# Patient Record
Sex: MALE | Race: WHITE | NOT HISPANIC OR LATINO | URBAN - METROPOLITAN AREA
[De-identification: names, ages, dates, MRNs, and addresses within clinical notes are randomized per-mention and may not be internally consistent; named-entity substitution may affect disease eponyms.]

---
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
## 2022-03-01 PROBLEM — Z00.00 ENCOUNTER FOR PREVENTIVE HEALTH EXAMINATION: Status: ACTIVE | Noted: 2022-03-01

## 2022-05-07 ENCOUNTER — TRANSCRIPTION ENCOUNTER (OUTPATIENT)
Age: 22
End: 2022-05-07

## 2022-05-07 ENCOUNTER — EMERGENCY (EMERGENCY)
Facility: HOSPITAL | Age: 22
LOS: 1 days | Discharge: OTHER INSTITUTION | End: 2022-05-07
Attending: EMERGENCY MEDICINE
Payer: COMMERCIAL

## 2022-05-07 VITALS
HEIGHT: 71 IN | RESPIRATION RATE: 20 BRPM | SYSTOLIC BLOOD PRESSURE: 112 MMHG | DIASTOLIC BLOOD PRESSURE: 72 MMHG | WEIGHT: 145.06 LBS | HEART RATE: 75 BPM | OXYGEN SATURATION: 99 % | TEMPERATURE: 98 F

## 2022-05-07 DIAGNOSIS — F41.9 ANXIETY DISORDER, UNSPECIFIED: ICD-10-CM

## 2022-05-07 LAB
ALBUMIN SERPL ELPH-MCNC: 4.7 G/DL — SIGNIFICANT CHANGE UP (ref 3.3–5.2)
ALP SERPL-CCNC: 73 U/L — SIGNIFICANT CHANGE UP (ref 40–120)
ALT FLD-CCNC: 10 U/L — SIGNIFICANT CHANGE UP
AMPHET UR-MCNC: NEGATIVE — SIGNIFICANT CHANGE UP
ANION GAP SERPL CALC-SCNC: 12 MMOL/L — SIGNIFICANT CHANGE UP (ref 5–17)
APAP SERPL-MCNC: <3 UG/ML — LOW (ref 10–26)
AST SERPL-CCNC: 15 U/L — SIGNIFICANT CHANGE UP
BARBITURATES UR SCN-MCNC: NEGATIVE — SIGNIFICANT CHANGE UP
BASOPHILS # BLD AUTO: 0.05 K/UL — SIGNIFICANT CHANGE UP (ref 0–0.2)
BASOPHILS NFR BLD AUTO: 1.1 % — SIGNIFICANT CHANGE UP (ref 0–2)
BENZODIAZ UR-MCNC: NEGATIVE — SIGNIFICANT CHANGE UP
BILIRUB SERPL-MCNC: 0.3 MG/DL — LOW (ref 0.4–2)
BUN SERPL-MCNC: 9.9 MG/DL — SIGNIFICANT CHANGE UP (ref 8–20)
CALCIUM SERPL-MCNC: 9.5 MG/DL — SIGNIFICANT CHANGE UP (ref 8.6–10.2)
CHLORIDE SERPL-SCNC: 104 MMOL/L — SIGNIFICANT CHANGE UP (ref 98–107)
CO2 SERPL-SCNC: 26 MMOL/L — SIGNIFICANT CHANGE UP (ref 22–29)
COCAINE METAB.OTHER UR-MCNC: NEGATIVE — SIGNIFICANT CHANGE UP
CREAT SERPL-MCNC: 0.9 MG/DL — SIGNIFICANT CHANGE UP (ref 0.5–1.3)
EGFR: 125 ML/MIN/1.73M2 — SIGNIFICANT CHANGE UP
EOSINOPHIL # BLD AUTO: 0.3 K/UL — SIGNIFICANT CHANGE UP (ref 0–0.5)
EOSINOPHIL NFR BLD AUTO: 6.7 % — HIGH (ref 0–6)
ETHANOL SERPL-MCNC: <10 MG/DL — SIGNIFICANT CHANGE UP (ref 0–9)
GLUCOSE SERPL-MCNC: 99 MG/DL — SIGNIFICANT CHANGE UP (ref 70–99)
HCT VFR BLD CALC: 48.1 % — SIGNIFICANT CHANGE UP (ref 39–50)
HGB BLD-MCNC: 15.5 G/DL — SIGNIFICANT CHANGE UP (ref 13–17)
IMM GRANULOCYTES NFR BLD AUTO: 0.2 % — SIGNIFICANT CHANGE UP (ref 0–1.5)
LYMPHOCYTES # BLD AUTO: 1.71 K/UL — SIGNIFICANT CHANGE UP (ref 1–3.3)
LYMPHOCYTES # BLD AUTO: 37.9 % — SIGNIFICANT CHANGE UP (ref 13–44)
MCHC RBC-ENTMCNC: 28.1 PG — SIGNIFICANT CHANGE UP (ref 27–34)
MCHC RBC-ENTMCNC: 32.2 GM/DL — SIGNIFICANT CHANGE UP (ref 32–36)
MCV RBC AUTO: 87.3 FL — SIGNIFICANT CHANGE UP (ref 80–100)
METHADONE UR-MCNC: NEGATIVE — SIGNIFICANT CHANGE UP
MONOCYTES # BLD AUTO: 0.42 K/UL — SIGNIFICANT CHANGE UP (ref 0–0.9)
MONOCYTES NFR BLD AUTO: 9.3 % — SIGNIFICANT CHANGE UP (ref 2–14)
NEUTROPHILS # BLD AUTO: 2.02 K/UL — SIGNIFICANT CHANGE UP (ref 1.8–7.4)
NEUTROPHILS NFR BLD AUTO: 44.8 % — SIGNIFICANT CHANGE UP (ref 43–77)
OPIATES UR-MCNC: NEGATIVE — SIGNIFICANT CHANGE UP
PCP SPEC-MCNC: SIGNIFICANT CHANGE UP
PCP UR-MCNC: NEGATIVE — SIGNIFICANT CHANGE UP
PLATELET # BLD AUTO: 246 K/UL — SIGNIFICANT CHANGE UP (ref 150–400)
POTASSIUM SERPL-MCNC: 4.2 MMOL/L — SIGNIFICANT CHANGE UP (ref 3.5–5.3)
POTASSIUM SERPL-SCNC: 4.2 MMOL/L — SIGNIFICANT CHANGE UP (ref 3.5–5.3)
PROT SERPL-MCNC: 7 G/DL — SIGNIFICANT CHANGE UP (ref 6.6–8.7)
RBC # BLD: 5.51 M/UL — SIGNIFICANT CHANGE UP (ref 4.2–5.8)
RBC # FLD: 12.8 % — SIGNIFICANT CHANGE UP (ref 10.3–14.5)
SALICYLATES SERPL-MCNC: <0.6 MG/DL — LOW (ref 10–20)
SARS-COV-2 RNA SPEC QL NAA+PROBE: SIGNIFICANT CHANGE UP
SODIUM SERPL-SCNC: 142 MMOL/L — SIGNIFICANT CHANGE UP (ref 135–145)
THC UR QL: POSITIVE
TSH SERPL-MCNC: 2.14 UIU/ML — SIGNIFICANT CHANGE UP (ref 0.27–4.2)
WBC # BLD: 4.51 K/UL — SIGNIFICANT CHANGE UP (ref 3.8–10.5)
WBC # FLD AUTO: 4.51 K/UL — SIGNIFICANT CHANGE UP (ref 3.8–10.5)

## 2022-05-07 PROCEDURE — 99284 EMERGENCY DEPT VISIT MOD MDM: CPT

## 2022-05-07 PROCEDURE — 90792 PSYCH DIAG EVAL W/MED SRVCS: CPT

## 2022-05-07 PROCEDURE — 93010 ELECTROCARDIOGRAM REPORT: CPT

## 2022-05-07 RX ORDER — SERTRALINE 25 MG/1
50 TABLET, FILM COATED ORAL DAILY
Refills: 0 | Status: DISCONTINUED | OUTPATIENT
Start: 2022-05-07 | End: 2022-05-11

## 2022-05-07 RX ADMIN — Medication 1 MILLIGRAM(S): at 22:27

## 2022-05-07 RX ADMIN — SERTRALINE 50 MILLIGRAM(S): 25 TABLET, FILM COATED ORAL at 21:01

## 2022-05-07 NOTE — ED BEHAVIORAL HEALTH ASSESSMENT NOTE - HPI (INCLUDE ILLNESS QUALITY, SEVERITY, DURATION, TIMING, CONTEXT, MODIFYING FACTORS, ASSOCIATED SIGNS AND SYMPTOMS)
Patient is 22 year old male, single, domiciled with parents, student at [employer] lives on campus. PPHX of depression, no prior psychiatric hospitalizations, no suicide attempt; no legal hx, no hx of violence; no significant PMHX. Patient presented to [facility] with worsening depression and suicidality. Psychiatry consulted for assessment of mood symptoms. Patient is 22 year old male, single, domiciled with parents, student at [employer] lives on campus. PPHX of depression, no prior psychiatric hospitalizations, no suicide attempt; no legal hx, no hx of violence; no significant PMHX. Patient presented to [facility] with worsening depression and suicidality. Psychiatry consulted for assessment of mood symptoms.    Patient was seen and evaluated in emergency room, mother at bedside. Patient interviewed in private, he presents calm and cooperative, well related, superficially pleasant and engaged in interview. Patient reports that he has had worsening depression over the past several months in the context of increased external stressors. Most recently he has been having "constant suicidal ideations" and last night he had a self-aborted suicide attempt in which he impulsively put a plastic bag over his face and pulled it tightly around his neck. He reports that he "felt relief for the first time." He goes on to note that he believes he was having a panic attack at the time in which he placed the bag over his face and denies that he had plan to do such. He is can not elaborate on why he decided to discontinue suicide attempt, but her reports that he did notify his outpatient therapist who instructed him to notify his parents.     He reports worsening depressive mood features including isolative behaviors while he is at school. He notes poor school performance with low motivation for ADLs and tasks, he complains of poor concentration, anhedonia, and difficulty with sleep latency, with sucidality. He reports worsening mood features in the contect . He reports he has ruminating thoughts and increased worries, finding them difficult to control. He admits to having panic attacks. Patient is 22 year old male, single, domiciled with parents, student at [employer] lives on campus. PPHX of depression and anxiety, no prior psychiatric hospitalizations, no suicide attempt; no legal hx, no hx of violence; no significant PMHX. Patient presented to [facility] with worsening depression and suicidality. Psychiatry consulted for assessment of mood symptoms.    Patient was seen and evaluated in emergency room, mother at bedside. Patient interviewed in private, he presents calm and cooperative, well related, superficially pleasant and engaged in interview. Patient reports that he has had worsening depression over the past several months in the context of increased external stressors. Most recently he has been having "constant suicidal ideations, I am always thinking of ways I could die, like I will see a train and then see myself getting hit by it." He reports last night he had a self-aborted suicide attempt in which he impulsively put a plastic bag over his face and pulled it tightly around his neck, no loss of consciousness. He reports that he "felt relief for the first time." He goes on to note that he believes he was having a panic attack at the time in which he placed the bag over his face and denies that he had plan to do such. He is can not elaborate on why he decided to discontinue suicide attempt, but her reports that he did notify his outpatient therapist who instructed him to notify his parents.     He reports worsening depressive mood features including isolative behaviors while he is at school, with limited psychosocial support. He notes poor school performance "horribly" with low motivation for ADLs and tasks, he complains of poor concentration, anhedonia, and difficulty with sleep latency, with suicidality. He states he participated in outpatient therapy but his psychiatrist who had been treating him with Zoloft 50mg x 1 year stopped excepting his insurance and gave him a 3 month supply until he found another provider. He reports he ran out of medication last week and was only seen for a brief intake with a psychiatrist, no medications were prescribed and with pending intake paperwork. He reports worsening mood features in the context of breakup with GF after 2.5 year relationship, he sites her as being his main source of support. He reports he has ruminating thoughts and increased worries, finding them difficult to control. He admits to having intermittent panic attacks. Patient admits to increasing cannabis use, reporting that it helps him feel more relaxed. He denies all other illicit drug use. Denies ETOH use. Appetite is unremarkable. Patients denies subjective manic features. NO AVH, no delusions or paranoia elicited. He can not identify protective factors and reports that he doesn't really see himself in the future and has no future plans. Patient is requesting inpatient psychiatric hospitalization for stabilization of mood symptoms.    Spoke with mother Cindy who reports that she had to send his father to pick him up from school after getting a call from him regarding worsening depression. She collaborates with the above statement regarding his psychiatric treatment history. She reports there were no development abnormalities, patient met developmental milestones appropriately. He did well in school and graduated at the top of his class, but regards he has been having a difficult time at college with limited support from peers. She is in agreement that patient would benefit from inpatient treatment for depression.

## 2022-05-07 NOTE — ED ADULT TRIAGE NOTE - CHIEF COMPLAINT QUOTE
for the past week Juanita been without my depression medication, recently came home from school. Been taking antidepressants for suicidal thoughts x1 year with improvement in symptoms however still having suicidal thoughts at times. accompanied by mother

## 2022-05-07 NOTE — ED BEHAVIORAL HEALTH ASSESSMENT NOTE - SUMMARY
Patient is 22 year old male, single, domiciled with parents, student at [employer] lives on campus. PPHX of depression and anxiety, no prior psychiatric hospitalizations, no suicide attempt; no legal hx, no hx of violence; no significant PMHX. Patient presented to [facility] with worsening depression and suicidality. Psychiatry consulted for assessment of mood symptoms.    Patient was seen and evaluated. Presents with depressed/full affect with mood congruent features. Patient with worsening depressive mood symptoms in the context of increased external psychosocial stressors and lapse in medication due to lack of outpatient provider. No subjective or observable manic sx. No over psychotic features. Presents with impulsive self aborted suicide attempt. Admits to recent Cannabis use. Evidence of Major depressive disorder, recurrent, severe without psychosis. Patient is at elevated risk of self harm. Patient is amendable to inpatient psychiatric hospitalization for mood stabilization.

## 2022-05-07 NOTE — ED BEHAVIORAL HEALTH ASSESSMENT NOTE - DESCRIPTION
Labs pending  UDS pending  Vital Signs Last 24 Hrs  T(C): 36.7 (07 May 2022 09:03), Max: 36.7 (07 May 2022 09:03)  T(F): 98 (07 May 2022 09:03), Max: 98 (07 May 2022 09:03)  HR: 75 (07 May 2022 09:03) (75 - 75)  BP: 112/72 (07 May 2022 09:03) (112/72 - 112/72)  BP(mean): --  RR: 20 (07 May 2022 09:03) (20 - 20)  SpO2: 99% (07 May 2022 09:03) (99% - 99%)  No behavioral disturbances Patient is currently residing at [facility] on campus, domiciled with parents, sophomore. oldest of three siblings, 2 younger siblings 18YO sister/ 17 brother twins. See HPI

## 2022-05-07 NOTE — ED STATDOCS - OBJECTIVE STATEMENT
23 y/o male with PMHx of depression and anxiety for two years presents with medication problems along with SI. Pt has been on Zoloft 50mg daily, ran out on Monday. Pt stopped taking it as prescribed for about 10 days priors to running out to stretch it out. Therapist is Karly Dick. Previous psychiatrist, Erin Avila stopped taking his insurance. PCP Dr. Kim prescribed 30 days of Zoloft to the pt.  No other medications. Pt states "I have in my mind a variety of ways to end my life." Pt also has been focusing on bridges and walking around similar areas. Yesterday pt put a garbage bag on his head and sat for a few minutes but did not do anything. 23 y/o male with PMHx of depression and anxiety for two years presents with medication problems along with SI. Pt has been on Zoloft 50mg daily, ran out on Monday. Pt stopped taking it as prescribed for about 10 days ago because he was running low and could not get refill or an appointment with psych; previous psychiatrist, Erin Avila stopped taking his insurance however, PCP Dr. Kim prescribed 30 days of Zoloft. Therapist is Karly Dick.   No other medications. Pt states "I have in my mind a variety of ways to end my life." Pt also has been focusing on bridges and walking around similar areas. Yesterday pt put a garbage bag on his head and sat for a few minutes but then removed.

## 2022-05-07 NOTE — ED BEHAVIORAL HEALTH ASSESSMENT NOTE - CURRENT INTENT
"MD Notification    Notified Person: MD    Notified Person Name:      Notification Date/Time: 2205 8/26/2020    Notification Interaction: Paged     Purpose of Notification: \"We need UA order for Obs 2 because ED order has been canceled and Lab wont accept it.\"    Orders Received: Not at this moment    Comments:    "
Yes

## 2022-05-07 NOTE — ED ADULT NURSE NOTE - OBJECTIVE STATEMENT
Pt A&OX3, amb ad josh, states he is having suicidal thoughts and attempted to hurt himself yesterday by putting plastic bag over his head and face.  Mother is with patient.  Pt has been taking his antidepressants as ordered.

## 2022-05-07 NOTE — ED BEHAVIORAL HEALTH ASSESSMENT NOTE - RISK ASSESSMENT
Modifiable: Acute mood symptoms, active substance use, psychosocial stressors, medication non-adherence, severe agitation/anxiety/panic, suicidality, poor judgment and impaired insight, low frustration tolerance, impulsivity, no future motivated    Non Modifiable: Hx of mood disorder      Protective factors: young; healthy; medication and treatment compliant; no history of hospitalizations, no suicide attempts; no self-injurious behavior; no hx of aggression/violence; no legal issues; motivated for help; articulate; strong psychosocial support; access to health services, future- oriented, help-seeking,     Overall Risk: Risk state deviates from baseline given noted modifiable/non-modifiable risk factors, which may potentiate the risk of mortality. Currently the patient is at  LOW/ HIGH acute risk for harm towards self and/or others. Low Acute Suicide Risk

## 2022-05-07 NOTE — ED STATDOCS - NS_ ATTENDINGSCRIBEDETAILS _ED_A_ED_FT
I, Damon Alaniz, performed the initial face to face bedside interview with this patient regarding history of present illness, review of symptoms and relevant past medical, social and family history.  I completed an independent physical examination.  I was the provider who initially evaluated this patient.  The history, relevant review of systems, past medical and surgical history, medical decision making, and physical examination was documented by the scribe in my presence and I attest to the accuracy of the documentation. Follow-up on ordered tests (ie labs, radiologic studies) and re-evaluation of the patient's status has been communicated to the resident.  Disposition of the patient will be based on test outcome and response to ED interventions.

## 2022-05-07 NOTE — ED ADULT NURSE NOTE - ED STAT RN HANDOFF DETAILS
Report given to Rohan FENG in .  Pt A&OX3, amb ad josh, denies any pain.  Mother at bedside when pt went into .

## 2022-05-08 ENCOUNTER — INPATIENT (INPATIENT)
Facility: HOSPITAL | Age: 22
LOS: 4 days | Discharge: HOME | DRG: 885 | End: 2022-05-13
Attending: PSYCHIATRY & NEUROLOGY | Admitting: PSYCHIATRY & NEUROLOGY
Payer: COMMERCIAL

## 2022-05-08 VITALS
TEMPERATURE: 98 F | DIASTOLIC BLOOD PRESSURE: 67 MMHG | OXYGEN SATURATION: 97 % | SYSTOLIC BLOOD PRESSURE: 110 MMHG | RESPIRATION RATE: 18 BRPM | HEART RATE: 77 BPM

## 2022-05-08 DIAGNOSIS — F32.9 MAJOR DEPRESSIVE DISORDER, SINGLE EPISODE, UNSPECIFIED: ICD-10-CM

## 2022-05-08 DIAGNOSIS — R45.81 LOW SELF-ESTEEM: ICD-10-CM

## 2022-05-08 LAB
COVID-19 SPIKE DOMAIN AB INTERP: POSITIVE
COVID-19 SPIKE DOMAIN ANTIBODY RESULT: >250 U/ML — HIGH
SARS-COV-2 IGG+IGM SERPL QL IA: >250 U/ML — HIGH
SARS-COV-2 IGG+IGM SERPL QL IA: POSITIVE

## 2022-05-08 PROCEDURE — 99231 SBSQ HOSP IP/OBS SF/LOW 25: CPT

## 2022-05-08 PROCEDURE — 85025 COMPLETE CBC W/AUTO DIFF WBC: CPT

## 2022-05-08 PROCEDURE — 84443 ASSAY THYROID STIM HORMONE: CPT

## 2022-05-08 PROCEDURE — 99285 EMERGENCY DEPT VISIT HI MDM: CPT

## 2022-05-08 PROCEDURE — 93005 ELECTROCARDIOGRAM TRACING: CPT

## 2022-05-08 PROCEDURE — U0003: CPT

## 2022-05-08 PROCEDURE — 80053 COMPREHEN METABOLIC PANEL: CPT

## 2022-05-08 PROCEDURE — 36415 COLL VENOUS BLD VENIPUNCTURE: CPT

## 2022-05-08 PROCEDURE — 86769 SARS-COV-2 COVID-19 ANTIBODY: CPT

## 2022-05-08 PROCEDURE — 83036 HEMOGLOBIN GLYCOSYLATED A1C: CPT

## 2022-05-08 PROCEDURE — U0005: CPT

## 2022-05-08 PROCEDURE — 80061 LIPID PANEL: CPT

## 2022-05-08 PROCEDURE — 80307 DRUG TEST PRSMV CHEM ANLYZR: CPT

## 2022-05-08 RX ADMIN — SERTRALINE 50 MILLIGRAM(S): 25 TABLET, FILM COATED ORAL at 11:46

## 2022-05-08 NOTE — BH SAFETY PLAN - DISTRACTION NAME 1
My friends: Heydi Jordan Conner, Peter and Saturnino.  My friends: Heydi Jordan Conner, Peter, Henry and Saturnino.

## 2022-05-08 NOTE — PROGRESS NOTE BEHAVIORAL HEALTH - VIOLENCE RISK FACTORS:
Problem: Falls - Risk of:  Goal: Will remain free from falls  Will remain free from falls   Outcome: Met This Shift  Patient is alert and oriented, able to use call light for help. Gait steady, currently up ad pedro. Will continue to monitor for changes. Verbalized understanding of remaining injury free. Call light within reach.
Problem: Falls - Risk of:  Goal: Will remain free from falls  Will remain free from falls   Outcome: Ongoing  Pt free from falls this shift, bed side table next to bed, call light in reach, bed in lowest position, wheels locked. Encouraged to call for any assistance    Problem: HEMODYNAMIC STATUS  Goal: Patient has stable vital signs and fluid balance  Outcome: Ongoing  Pulse rate and rhythm, peripheral pulses, and capillary refill assessed every shift with assessment. General color and body temperature monitored throughout shift and with vitals. Assess for edema with head to toe assessment. Administer treatments and medications as ordered. Monitor patient's weight.
Problem: Pain:  Goal: Pain level will decrease  Pain level will decrease    Outcome: Ongoing  Rib pain from chest compressions. tordol given. Will monitor    Problem: Pain:  Goal: Pain level will decrease  Pain level will decrease    Outcome: Ongoing  Rib pain from chest compressions. tordol given.  Will monitor
Impulsivity

## 2022-05-08 NOTE — PROGRESS NOTE BEHAVIORAL HEALTH - NSBHFUPADDHPIFT_PSY_A_CORE
bart Kim 3/2022 note indicating pt did well on zoloft 50mg bart Kim 3/15/2022 note indicating pt did well on zoloft 50mg

## 2022-05-08 NOTE — CHART NOTE - NSCHARTNOTEFT_GEN_A_CORE
EDUARDO role ongoing for voluntary inpatient psychiatric admission. EDUARDO spoke with Carolyn at [facility], [phone] who confirmed patient has been accepted for admission, accepting MD Dr. Emily Meehan. 9.13 legals. Patient made aware. EDUARDO spoke with patient's mother, Cindy Aldana [phone] and informed of admission.  RN called and gave report. EDUARDO spoke with Wilman at [facility], [phone] and arranged ambulance for next available . [facility] transportation letter sent with patient in transfer packet. SW to obtain authorization for admission. ED provider made aware.

## 2022-05-08 NOTE — ED ADULT NURSE REASSESSMENT NOTE - NS ED NURSE REASSESS COMMENT FT1
Assumed care of pt awake alert and oriented x4, plan of care reviewed with pt and educated on the pending transfer to [facility] for inpatient psych tx. report provided to Ramakrishna FENG of [facility] [place] [facility]. pt appears calm at this time and provided breakfast. no attempts to harm self or others.
No event reported, patient slept well, no complain of suicidal ideation, no visual or auditory hallucination, safety maintained.
Patient in bed sleeping, easily arousable, safety maintained.
plan of care reviewed with pt and pt re-oriented to the unit. pt reports feeling anxious and nurse practitioner made aware. report given to Lilia FENG
pt received medically cleared by the ed attending. pt c/o depression and suicidal thoughts with an attempt to harm himself by placing plastic bag over his head yesterday. pt reports breaking up with a recent relationship from college. pt endorses going inpatient voluntarily.
pts mother called, Stefania [phone].
Patient in room alert and responsive, denied suicidal thoughts, denied visual and auditory hallucination. Patient is awaiting for transfer, safety maintained.

## 2022-05-08 NOTE — CONSULT NOTE ADULT - NS ATTEND AMEND GEN_ALL_CORE FT
Joann Lowe (PA) history as documented below reviewed. Please see consult for full details regarding the service. Patient seen and examined at the bedside. We were consulted for medical management of the pt while in the inpatient psychiatric unit.    A/P:   1. Depression, suicidal ideation, substance abuse   - Management per psychiatry   - Suicide precuations     DVT ppx: Encourage ambulation     We will sign off, please reconsult as needed. Thank you.

## 2022-05-08 NOTE — PATIENT PROFILE BEHAVIORAL HEALTH - FALL HARM RISK - UNIVERSAL INTERVENTIONS
Bed in lowest position, wheels locked, appropriate side rails in place/Call bell, personal items and telephone in reach/Instruct patient to call for assistance before getting out of bed or chair/Non-slip footwear when patient is out of bed/[place] to call system/Physically safe environment - no spills, clutter or unnecessary equipment/Purposeful Proactive Rounding/Room/bathroom lighting operational, light cord in reach

## 2022-05-08 NOTE — BH SAFETY PLAN - WARNING SIGN 1
Suicidal thoughts on/ off past 2-3 years.   Triggers: College, the work load. Living alone. Break up with significant other 3 months ago and seeing them with someone new.  Suicidal thoughts on/ off past 2-3 years.   Triggers: College, the work load. Living alone. Break up with significant other 3 months ago and seeing them with someone new.   Isolation. Pressures.

## 2022-05-08 NOTE — BH SAFETY PLAN - ENVIRONMENT SAFETY 2:
Continue seeing my therapist every Wednesday and tell her honestly how I am feeling. Take medication daily.  Attending partial. Continue seeing my therapist every Wednesday and tell her honestly how I am feeling. Take medication daily.

## 2022-05-08 NOTE — PROGRESS NOTE BEHAVIORAL HEALTH - NSBHFUPIPCHARTREVFT_PSY_A_CORE
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 He claims he impulsively put a plastic bag over his face and pulled it tightly around his neck, no loss of consciousness. He reports that he "felt relief for the first time. Claims he is with poor sleep, lack of motivation Claims he ran out of zoloft 50mg daily  last week as he could not for over a month find another psychiatrist who took his insurance. 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 He claims he impulsively put a plastic bag over his face and pulled it tightly around his neck, no loss of consciousness. He reports that he "felt relief for the first time. Claims he is with poor sleep, lack of motivation Claims he ran out of zoloft 50mg daily  last week as he could not for over a month find another psychiatrist who took his insurance. Previous note in chart from his internist Yoel Vance 3/15/2022 gave one month of zoloft 50mg  which would end by April and pt claims he finally ran out of medication . Intake assessment note from [facility]  indicated " He reports he ran out of medication last week and was only seen for a brief intake with a psychiatrist, no medications were prescribed and with pending intake paperwork. "  Pt and his mother indicated that pt was "doing better with the zoloft 50mg but still not good enough."  Pt claims to still be with suicidal ideation "of I could jump out of the window or bash my head against the wall." However the pt denies intent  and claimed "I will be able to be save while in the hospital"

## 2022-05-08 NOTE — CONSULT NOTE ADULT - SUBJECTIVE AND OBJECTIVE BOX
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 He claims he impulsively put a plastic bag over his face and pulled it tightly around his neck, no loss of consciousness.     Patient seen and examined at bedside with MA. Patient was initially sleeping. Patient is pleasant and cooperative with interview and exam. Patient reports feeling well. States he was having some dizziness earlier in the day, but has since resolved with rest.   Denies any current complaints at this time.    PAST MEDICAL & SURGICAL HISTORY:  History of depression    No past surgical history     FAMILY HISTORY:   non-contributory to the patient's current presentation    SOCIAL HISTORY:  no smoking, no alcohol abuse, cannabis use (daily use)       REVIEW OF SYSTEMS:    CONSTITUTIONAL: No weakness, fevers or chills  EYES/ENT: No visual changes; No vertigo or throat pain   NECK: No pain or stiffness  RESPIRATORY: No cough, wheezing, hemoptysis; No shortness of breath  CARDIOVASCULAR: No chest pain or palpitations  GASTROINTESTINAL: No abdominal or epigastric pain. No nausea, vomiting, No diarrhea or constipation.   GENITOURINARY: No dysuria, frequency or hematuria  NEUROLOGICAL: No numbness or weakness  SKIN: No itching, burning, rashes, or lesions   All other review of systems is negative unless indicated above.      VITALS:  T(F): 98.1 (05-08-22 @ 12:05), Max: 98.6 (05-07-22 @ 23:36)  HR: 77 (05-08-22 @ 12:05) (65 - 77)  BP: 110/67 (05-08-22 @ 12:05) (97/59 - 116/60)  RR: 18 (05-08-22 @ 12:05) (17 - 19)  SpO2: 97% (05-08-22 @ 12:05) (96% - 97%)  Wt(kg): --      PHYSICAL EXAM:    GEN: Patient is pleasant, and cooperative with interview and exam. in NAD   HEENT:  pupils equal and reactive, EOMI, sclera is white. no oropharyngeal lesions, erythema, exudates, oral thrush  NECK:   supple, no palpable lymph nodes, no strangulation marks.   CV:  +S1, +S2, regular, no murmurs or rubs  RESP:   lungs clear to auscultation bilaterally, no wheezing, rales, rhonchi, good air entry bilaterally, breathing with normal effort, no signs of respiratory distress   GI:  abdomen soft, non-tender, non-distended, no abdominal masses, no palpable masses  MSK:   normal muscle tone, no atrophy, no rigidity, no contractions, able to move all four extremities without difficulty   EXT:  no clubbing, no cyanosis, no edema, no calf pain, swelling or erythema  VASCULAR:  pulses equal and symmetric in the upper and lower extremities  NEURO:  AAOX3, no focal neurological deficits, follows all commands, able to move extremities spontaneously, answers questions appropriately. Speaking in full coherent sentences.   SKIN:  no ulcers, lesions or rashes observed. Skin is warm, moist and intact. Normal cap refill < 2 seconds     LABS:                          15.5   4.51  )-----------( 246      ( 07 May 2022 12:09 )             48.1     05-07    142  |  104  |  9.9  ----------------------------<  99  4.2   |  26.0  |  0.90    Ca    9.5      07 May 2022 12:09    TPro  7.0  /  Alb  4.7  /  TBili  0.3<L>  /  DBili  x   /  AST  15  /  ALT  10  /  AlkPhos  73  05-07        LIVER FUNCTIONS - ( 07 May 2022 12:09 )  Alb: 4.7 g/dL / Pro: 7.0 g/dL / ALK PHOS: 73 U/L / ALT: 10 U/L / AST: 15 U/L / GGT: x               EKG: None in chart, will order for AM

## 2022-05-08 NOTE — CONSULT NOTE ADULT - ASSESSMENT
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 He claims he impulsively put a plastic bag over his face and pulled it tightly around his neck, no loss of consciousness. Patient being admitted to inpatient psychiatry unit for stabilization and further management.       PLAN:     1. PSYCH/ DEPRESSION/ SI / SUBSTANCE ABUSE   -Admit to 5N inpatient psychiatry   -suicide precautions ( no sharps, laces, drawstrings)   - medication adjustment as per psych   -Utox positive for THC, patient currently stable. Monitor for signs/symptoms of withdrawal     2. DVT PROPHYLAXIS   -Patient is young and ambulatory. No pharmacologic prophylaxis warranted at this time. Encourage frequent ambulation.     Will order screening EKG for AM    No active issues requiring medical attention at this time. Patient admitted with depression and will be managed primarily by psych team. Will sign-off. Re-consult Hospitalist service as needed, should future issues arise in the future.     Case and Plan reviewed by Attending Physician

## 2022-05-09 VITALS — RESPIRATION RATE: 18 BRPM | HEIGHT: 70 IN | TEMPERATURE: 98 F | OXYGEN SATURATION: 100 % | WEIGHT: 126.1 LBS

## 2022-05-09 LAB
A1C WITH ESTIMATED AVERAGE GLUCOSE RESULT: 5.5 % — SIGNIFICANT CHANGE UP (ref 4–5.6)
ALBUMIN SERPL ELPH-MCNC: 4.2 G/DL — SIGNIFICANT CHANGE UP (ref 3.3–5)
ALP SERPL-CCNC: 72 U/L — SIGNIFICANT CHANGE UP (ref 40–120)
ALT FLD-CCNC: 18 U/L — SIGNIFICANT CHANGE UP (ref 12–78)
ANION GAP SERPL CALC-SCNC: 8 MMOL/L — SIGNIFICANT CHANGE UP (ref 5–17)
AST SERPL-CCNC: 12 U/L — LOW (ref 15–37)
BASOPHILS # BLD AUTO: 0.05 K/UL — SIGNIFICANT CHANGE UP (ref 0–0.2)
BASOPHILS NFR BLD AUTO: 0.9 % — SIGNIFICANT CHANGE UP (ref 0–2)
BILIRUB SERPL-MCNC: 0.7 MG/DL — SIGNIFICANT CHANGE UP (ref 0.2–1.2)
BUN SERPL-MCNC: 12 MG/DL — SIGNIFICANT CHANGE UP (ref 7–23)
CALCIUM SERPL-MCNC: 9.7 MG/DL — SIGNIFICANT CHANGE UP (ref 8.5–10.1)
CHLORIDE SERPL-SCNC: 106 MMOL/L — SIGNIFICANT CHANGE UP (ref 96–108)
CHOLEST SERPL-MCNC: 155 MG/DL — SIGNIFICANT CHANGE UP
CO2 SERPL-SCNC: 27 MMOL/L — SIGNIFICANT CHANGE UP (ref 22–31)
CREAT SERPL-MCNC: 1.07 MG/DL — SIGNIFICANT CHANGE UP (ref 0.5–1.3)
EGFR: 102 ML/MIN/1.73M2 — SIGNIFICANT CHANGE UP
EOSINOPHIL # BLD AUTO: 0.17 K/UL — SIGNIFICANT CHANGE UP (ref 0–0.5)
EOSINOPHIL NFR BLD AUTO: 2.9 % — SIGNIFICANT CHANGE UP (ref 0–6)
ESTIMATED AVERAGE GLUCOSE: 111 MG/DL — SIGNIFICANT CHANGE UP (ref 68–114)
GLUCOSE SERPL-MCNC: 93 MG/DL — SIGNIFICANT CHANGE UP (ref 70–99)
HCT VFR BLD CALC: 49 % — SIGNIFICANT CHANGE UP (ref 39–50)
HDLC SERPL-MCNC: 38 MG/DL — LOW
HGB BLD-MCNC: 16.2 G/DL — SIGNIFICANT CHANGE UP (ref 13–17)
IMM GRANULOCYTES NFR BLD AUTO: 0.2 % — SIGNIFICANT CHANGE UP (ref 0–1.5)
LIPID PNL WITH DIRECT LDL SERPL: 104 MG/DL — HIGH
LYMPHOCYTES # BLD AUTO: 2.35 K/UL — SIGNIFICANT CHANGE UP (ref 1–3.3)
LYMPHOCYTES # BLD AUTO: 40 % — SIGNIFICANT CHANGE UP (ref 13–44)
MCHC RBC-ENTMCNC: 28.4 PG — SIGNIFICANT CHANGE UP (ref 27–34)
MCHC RBC-ENTMCNC: 33.1 GM/DL — SIGNIFICANT CHANGE UP (ref 32–36)
MCV RBC AUTO: 85.8 FL — SIGNIFICANT CHANGE UP (ref 80–100)
MONOCYTES # BLD AUTO: 0.49 K/UL — SIGNIFICANT CHANGE UP (ref 0–0.9)
MONOCYTES NFR BLD AUTO: 8.3 % — SIGNIFICANT CHANGE UP (ref 2–14)
NEUTROPHILS # BLD AUTO: 2.8 K/UL — SIGNIFICANT CHANGE UP (ref 1.8–7.4)
NEUTROPHILS NFR BLD AUTO: 47.7 % — SIGNIFICANT CHANGE UP (ref 43–77)
NON HDL CHOLESTEROL: 117 MG/DL — SIGNIFICANT CHANGE UP
PLATELET # BLD AUTO: 262 K/UL — SIGNIFICANT CHANGE UP (ref 150–400)
POTASSIUM SERPL-MCNC: 3.9 MMOL/L — SIGNIFICANT CHANGE UP (ref 3.5–5.3)
POTASSIUM SERPL-SCNC: 3.9 MMOL/L — SIGNIFICANT CHANGE UP (ref 3.5–5.3)
PROT SERPL-MCNC: 7.1 GM/DL — SIGNIFICANT CHANGE UP (ref 6–8.3)
RBC # BLD: 5.71 M/UL — SIGNIFICANT CHANGE UP (ref 4.2–5.8)
RBC # FLD: 12.7 % — SIGNIFICANT CHANGE UP (ref 10.3–14.5)
SODIUM SERPL-SCNC: 141 MMOL/L — SIGNIFICANT CHANGE UP (ref 135–145)
TRIGL SERPL-MCNC: 67 MG/DL — SIGNIFICANT CHANGE UP
TSH SERPL-MCNC: 1.05 UU/ML — SIGNIFICANT CHANGE UP (ref 0.34–4.82)
WBC # BLD: 5.87 K/UL — SIGNIFICANT CHANGE UP (ref 3.8–10.5)
WBC # FLD AUTO: 5.87 K/UL — SIGNIFICANT CHANGE UP (ref 3.8–10.5)

## 2022-05-09 PROCEDURE — 99221 1ST HOSP IP/OBS SF/LOW 40: CPT

## 2022-05-09 PROCEDURE — 93010 ELECTROCARDIOGRAM REPORT: CPT

## 2022-05-09 PROCEDURE — 99223 1ST HOSP IP/OBS HIGH 75: CPT

## 2022-05-09 RX ORDER — SERTRALINE 25 MG/1
75 TABLET, FILM COATED ORAL DAILY
Refills: 0 | Status: DISCONTINUED | OUTPATIENT
Start: 2022-05-09 | End: 2022-05-13

## 2022-05-09 RX ORDER — LANOLIN ALCOHOL/MO/W.PET/CERES
5 CREAM (GRAM) TOPICAL AT BEDTIME
Refills: 0 | Status: DISCONTINUED | OUTPATIENT
Start: 2022-05-09 | End: 2022-05-13

## 2022-05-09 RX ADMIN — Medication 0.5 MILLIGRAM(S): at 10:06

## 2022-05-09 RX ADMIN — SERTRALINE 75 MILLIGRAM(S): 25 TABLET, FILM COATED ORAL at 10:05

## 2022-05-10 PROCEDURE — 99232 SBSQ HOSP IP/OBS MODERATE 35: CPT

## 2022-05-10 RX ADMIN — Medication 0.5 MILLIGRAM(S): at 11:27

## 2022-05-10 RX ADMIN — SERTRALINE 75 MILLIGRAM(S): 25 TABLET, FILM COATED ORAL at 09:59

## 2022-05-10 RX ADMIN — Medication 5 MILLIGRAM(S): at 23:19

## 2022-05-11 PROCEDURE — 99232 SBSQ HOSP IP/OBS MODERATE 35: CPT

## 2022-05-11 RX ADMIN — Medication 5 MILLIGRAM(S): at 23:07

## 2022-05-11 RX ADMIN — SERTRALINE 75 MILLIGRAM(S): 25 TABLET, FILM COATED ORAL at 09:19

## 2022-05-12 PROCEDURE — 99231 SBSQ HOSP IP/OBS SF/LOW 25: CPT

## 2022-05-12 RX ADMIN — SERTRALINE 75 MILLIGRAM(S): 25 TABLET, FILM COATED ORAL at 09:27

## 2022-05-13 VITALS — TEMPERATURE: 98 F | RESPIRATION RATE: 16 BRPM | OXYGEN SATURATION: 100 %

## 2022-05-13 DIAGNOSIS — F33.2 MAJOR DEPRESSIVE DISORDER, RECURRENT SEVERE WITHOUT PSYCHOTIC FEATURES: ICD-10-CM

## 2022-05-13 PROCEDURE — 99239 HOSP IP/OBS DSCHRG MGMT >30: CPT

## 2022-05-13 RX ORDER — SERTRALINE 25 MG/1
3 TABLET, FILM COATED ORAL
Qty: 90 | Refills: 0
Start: 2022-05-13 | End: 2022-06-11

## 2022-05-13 RX ORDER — LANOLIN ALCOHOL/MO/W.PET/CERES
1 CREAM (GRAM) TOPICAL
Qty: 30 | Refills: 0
Start: 2022-05-13 | End: 2022-06-11

## 2022-05-13 RX ADMIN — SERTRALINE 75 MILLIGRAM(S): 25 TABLET, FILM COATED ORAL at 09:31

## 2022-05-13 NOTE — DISCHARGE NOTE BEHAVIORAL HEALTH - NSBHDCCRISISPLAN1FT_PSY_A_CORE
Tell a trusted friend/family member, tell clinic staff, call a crisis line ( Crisis Center ph. [phone], [facility] DASH [phone], [facility] (peer support) ph. [phone],), return to the nearest emergency room. You may call 9-1-1 for assistance.

## 2022-05-13 NOTE — PROGRESS NOTE BEHAVIORAL HEALTH - NS ED BHA MSE GENERAL APPEARANCE
No deformities present
stable
No deformities present
No deformities present

## 2022-05-13 NOTE — PROGRESS NOTE BEHAVIORAL HEALTH - SUMMARY
Patient is 22 year old male, single, domiciled with parents, student at [employer] lives on campus. PPHX of depression and anxiety, no prior psychiatric hospitalizations, no suicide attempt; no legal hx, no hx of violence; no significant PMHX. Patient presented to [facility] with worsening depression and suicidality. Psychiatry consulted for assessment of mood symptoms.    Patient was seen and evaluated. Presents with depressed/full affect with mood congruent features. Patient with worsening depressive mood symptoms in the context of increased external psychosocial stressors and lapse in medication due to lack of outpatient provider. No subjective or observable manic sx. No over psychotic features. Presents with impulsive self aborted suicide attempt. Admits to recent Cannabis use. Evidence of Major depressive disorder, recurrent, severe without psychosis. Patient is at elevated risk of self harm. Patient is amendable to inpatient psychiatric hospitalization for mood stabilization.
Patient is 22 year old male, single, domiciled with parents, student at [employer] lives on campus. PPHX of depression and anxiety, no prior psychiatric hospitalizations, no suicide attempt; no legal hx, no hx of violence; no significant PMHX. Patient presented to [facility] with worsening depression and suicidality. Psychiatry consulted for assessment of mood symptoms.    Patient was seen and evaluated. Presents with depressed/full affect with mood congruent features. Patient with worsening depressive mood symptoms in the context of increased external psychosocial stressors and lapse in medication due to lack of outpatient provider. No subjective or observable manic sx. No over psychotic features. Presents with impulsive self aborted suicide attempt. Admits to recent Cannabis use. Evidence of Major depressive disorder, recurrent, severe without psychosis. Patient is at elevated risk of self harm. Patient is amendable to inpatient psychiatric hospitalization for mood stabilization.
Patient is 22 year old male, single, domiciled with parents, student at [employer] lives on campus. PPHX of depression and anxiety, no prior psychiatric hospitalizations, no suicide attempt; no legal hx, no hx of violence; no significant PMHX. Patient presented to [facility] with worsening depression and suicidality. Psychiatry consulted for assessment of mood symptoms.    Patient was seen and evaluated. Presents with depressed/full affect with mood congruent features. Patient with worsening depressive mood symptoms in the context of increased external psychosocial stressors and lapse in medication due to lack of outpatient provider. No subjective or observable manic sx. No over psychotic features. Presents with impulsive self aborted suicide attempt. Admits to recent Cannabis use. Evidence of Major depressive disorder, recurrent, severe without psychosis. Patient is at elevated risk of self harm. Patient is amendable to inpatient psychiatric hospitalization for mood stabilization.
Patient is 22 year old male, single, domiciled with parents, student at [employer] lives on campus. PPHX of depression and anxiety, no prior psychiatric hospitalizations, no suicide attempt; no legal hx, no hx of violence; no significant PMHX. Patient presented to [facility] with worsening depression and suicidality. Psychiatry consulted for assessment of mood symptoms.    Patient was seen and evaluated. Presents with depressed/full affect with mood congruent features. Patient with worsening depressive mood symptoms in the context of increased external psychosocial stressors and lapse in medication due to lack of outpatient provider. No subjective or observable manic sx. No over psychotic features. Presents with impulsive self aborted suicide attempt. Admits to recent Cannabis use. Evidence of Major depressive disorder, recurrent, severe without psychosis. Patient is at elevated risk of self harm. Patient is amendable to inpatient psychiatric hospitalization for mood stabilization.
Patient is 22 year old male, single, domiciled with parents, student at [employer] lives on campus. PPHX of depression and anxiety, no prior psychiatric hospitalizations, no suicide attempt; no legal hx, no hx of violence; no significant PMHX. Patient presented to [facility] with worsening depression and suicidality. Psychiatry consulted for assessment of mood symptoms.    Patient was seen and evaluated. Presents with depressed/full affect with mood congruent features. Patient with worsening depressive mood symptoms in the context of increased external psychosocial stressors and lapse in medication due to lack of outpatient provider. No subjective or observable manic sx. No over psychotic features. Presents with impulsive self aborted suicide attempt. Admits to recent Cannabis use. Evidence of Major depressive disorder, recurrent, severe without psychosis. Patient is at elevated risk of self harm. Patient is amendable to inpatient psychiatric hospitalization for mood stabilization.
Patient is 22 year old male, single, domiciled with parents, student at [employer] lives on campus. PPHX of depression and anxiety, no prior psychiatric hospitalizations, no suicide attempt; no legal hx, no hx of violence; no significant PMHX. Patient presented to [facility] with worsening depression and suicidality. Psychiatry consulted for assessment of mood symptoms.    Patient was seen and evaluated. Presents with depressed/full affect with mood congruent features. Patient with worsening depressive mood symptoms in the context of increased external psychosocial stressors and lapse in medication due to lack of outpatient provider. No subjective or observable manic sx. No over psychotic features. Presents with impulsive self aborted suicide attempt. Admits to recent Cannabis use. Evidence of Major depressive disorder, recurrent, severe without psychosis. Patient is at elevated risk of self harm. Patient is amendable to inpatient psychiatric hospitalization for mood stabilization.

## 2022-05-13 NOTE — DISCHARGE NOTE BEHAVIORAL HEALTH - NSBHDCSWCOMMENTSFT_PSY_A_CORE
Pt. was educated on the importance of taking medications as prescribed and to not stop or miss any doses unless directed by prescribing provider. Pt. was counseled on the importance of attending outpatient appointments for ongoing development of coping skills in particular. Pt. was made aware of crisis resources to use after hours as needed including returning to the hospital.

## 2022-05-13 NOTE — DISCHARGE NOTE BEHAVIORAL HEALTH - FAMILY HISTORY OF PSYCHIATRIC ILLNESS
Pt. 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 [phone]) as support, denies hx. of abuse/neglect, family hx. of father w/ SA while in college.

## 2022-05-13 NOTE — PROGRESS NOTE BEHAVIORAL HEALTH - NSBHADMITMEDEDUDETAILS_A_CORE FT
use of medication and potential side effect

## 2022-05-13 NOTE — DISCHARGE NOTE BEHAVIORAL HEALTH - NSBHDCCRISISPLAN3FT_PSY_A_CORE
Discuss in treatment with counselor, attended a local/online self-help group, call a hotline ([facility] (Substance Abuse and Mental Health Services [facility])[phone])

## 2022-05-13 NOTE — DISCHARGE NOTE BEHAVIORAL HEALTH - CARE PROVIDER_API CALL
[facility],   See SW note below for continued after care f/u  Phone: (   )    -  Fax: (   )    -  Follow Up Time:

## 2022-05-13 NOTE — PROGRESS NOTE BEHAVIORAL HEALTH - NSBHFUPINTERVALCCFT_PSY_A_CORE
"I still have suicidal thoughts"

## 2022-05-13 NOTE — DISCHARGE NOTE BEHAVIORAL HEALTH - PROVIDER TOKENS
FREE:[LAST:[[facility]],PHONE:[(   )    -],FAX:[(   )    -],ADDRESS:[See SW note below for continued after care f/u]]

## 2022-05-13 NOTE — PROGRESS NOTE BEHAVIORAL HEALTH - NSBHCHARTREVIEWLAB_PSY_A_CORE FT
LIVER FUNCTIONS - ( 07 May 2022 12:09 )  Alb: 4.7 g/dL / Pro: 7.0 g/dL / ALK PHOS: 73 U/L / ALT: 10 U/L / AST: 15 U/L / GGT: x           05-07    142  |  104  |  9.9  ----------------------------<  99  4.2   |  26.0  |  0.90    Ca    9.5      07 May 2022 12:09    TPro  7.0  /  Alb  4.7  /  TBili  0.3<L>  /  DBili  x   /  AST  15  /  ALT  10  /  AlkPhos  73  05-07    CBC Full  -  ( 07 May 2022 12:09 )  WBC Count : 4.51 K/uL  RBC Count : 5.51 M/uL  Hemoglobin : 15.5 g/dL  Hematocrit : 48.1 %  Platelet Count - Automated : 246 K/uL  Mean Cell Volume : 87.3 fl  Mean Cell Hemoglobin : 28.1 pg  Mean Cell Hemoglobin Concentration : 32.2 gm/dL  Auto Neutrophil # : 2.02 K/uL  Auto Lymphocyte # : 1.71 K/uL  Auto Monocyte # : 0.42 K/uL  Auto Eosinophil # : 0.30 K/uL  Auto Basophil # : 0.05 K/uL  Auto Neutrophil % : 44.8 %  Auto Lymphocyte % : 37.9 %  Auto Monocyte % : 9.3 %  Auto Eosinophil % : 6.7 %  Auto Basophil % : 1.1 %

## 2022-05-13 NOTE — DISCHARGE NOTE BEHAVIORAL HEALTH - NSBHDCNOCAREGVRFT_PSY_A_CORE
Pt. declined to elect one. However, with pt.'s consent, his mother Cindy Aldana (ph. [phone]) was involved and made aware of the discharge plan

## 2022-05-13 NOTE — DISCHARGE NOTE BEHAVIORAL HEALTH - NS MD DC FALL RISK RISK
For information on Fall & Injury Prevention, visit: [email or web address] OR  [email or web address] OR  https://www.cdc.gov/steadi/patient.html

## 2022-05-13 NOTE — PROGRESS NOTE BEHAVIORAL HEALTH - NSBHFUPINTERVALHXFT_PSY_A_CORE
Pt and his mother indicated that pt was "doing better with the zoloft 50mg but still not good enough."  Pt claims to still be with suicidal ideation "of I could jump out of the window or bash my head against the wall." However the pt denies intent  and claimed "I will be able to be save while in the hospital"    05/09/2022: Patient was seen today AM with EDUARDO, chart reviewed and discussed in team. He was transferred from [facility] to  as he tried to suffocate himself with a plastic bag on his head. He did at college and was BIB/Father at [facility]. He endorses that he is depressed since H. School and was on meds Zoloft 50 mg daily for a year and was under Dr. Khan, but had to stop due to insurance issues. he was not given any privileges at [facility] for his depression, he asked school guidance, but was not available. he is depressed, broke up a relationship a few days ago and was suicidal, he was give Zoloft 50 mg daily, felt OK but seems to need a higher dosages for further stability. No medical issues has limited OCD type Behavior. To assess tomorrow for any other mood or OCD type behavior in AM.    Plan: 1. Zoloft increased to Zoloft 75 mg daily          2. Ativan 0.5 mg x 1 dose for anxiety    05/10/2022: Patient was seen today AM with PING Epps, chart reviewed and discussed in team. Mood upset, as he was staying in the same room with patient JW. He was informed that room to be assigned after few patient leaves today, has limited sleep issues was advised that once the room was changed things would get back to normal. he has limited OCD symptoms e.g. stepping on middle of square sign when walking, limited food not to mix with other foods, but nothing time consuming or disturbing, Appetite seems OK ad to f/u as needed for stability/safety. Not S/h and no meds changes needed for today. Ativan 0.5 mg x 1 dose given today AM    05/11/2022: Patient was seen today AM, chart reviewed and discussed in team. He endorses that he is responding well to the increased dosages of Zoloft 75 mg /day. He has good sleep/appetite, feels better with increasing confidence and optimism. Discussed the option of Zoloft s/e and is aware of the meds induced s/e and also informed the duration of Anti-depressants to be taken initially. Spoke with his mother yesterday and to see her today during visiting hours.    05/12/2022: Patient was seen today AM, chart reviewed and discussed in team. Overall improved, prefers to stay away from trouble in the unit. Mood in better control not S/h and agreed to go to [facility] on Monday. No meds changes for now, his mother aware fo the discharge plan.    05/13/2022: Patient was seen today AM, chart reviewed and discussed in team. Mood in control not S/h and has been meds compliant since he came here. Spoke with his mother who is aware that he is doing very well and would start [facility] in [place] on Tuesday. To continue the meds as previously given
Pt and his mother indicated that pt was "doing better with the zoloft 50mg but still not good enough."  Pt claims to still be with suicidal ideation "of I could jump out of the window or bash my head against the wall." However the pt denies intent  and claimed "I will be able to be save while in the hospital"    05/09/2022: Patient was seen today AM with EDUARDO, chart reviewed and discussed in team. He was transferred from [facility] to  as he tried to suffocate himself with a plastic bag on his head. He did at college and was BIB/Father at [facility]. He endorses that he is depressed since H. School and was on meds Zoloft 50 mg daily for a year and was under Dr. Khan, but had to stop due to insurance issues. he was not given any privileges at [facility] for his depression, he asked school guidance, but was not available. he is depressed, broke up a relationship a few days ago and was suicidal, he was give Zoloft 50 mg daily, felt OK but seems to need a higher dosages for further stability. No medical issues has limited OCD type Behavior. To assess tomorrow for any other mood or OCD type behavior in AM.    Plan: 1. Zoloft increased to Zoloft 75 mg daily          2. Ativan 0.5 mg x 1 dose for anxiety    05/10/2022: Patient was seen today AM with PING Epps, chart reviewed and discussed in team. Mood upset, as he was staying in the same room with patient JW. He was informed that room to be assigned after few patient leaves today, has limited sleep issues was advised that once the room was changed things would get back to normal. he has limited OCD symptoms e.g. stepping on middle of square sign when walking, limited food not to mix with other foods, but nothing time consuming or disturbing, Appetite seems OK ad to f/u as needed for stability/safety. Not S/h and no meds changes needed for today. Ativan 0.5 mg x 1 dose given today AM    05/11/2022: Patient was seen today AM, chart reviewed and discussed in team. He endorses that he is responding well to the increased dosages of Zoloft 75 mg /day. He has good sleep/appetite, feels better with increasing confidence and optimism. Discussed the option of Zoloft s/e and is aware of the meds induced s/e and also informed the duration of Anti-depressants to be taken initially. Spoke with his mother yesterday and to see her today during visiting hours.    05/12/2022: Patient was seen today AM, chart reviewed and discussed in team. Overall improved, prefers to stay away from trouble in the unit. Mood in better control not S/h and agreed to go to [facility] on Monday. No meds changes for now, his mother aware fo the discharge plan.
Pt and his mother indicated that pt was "doing better with the zoloft 50mg but still not good enough."  Pt claims to still be with suicidal ideation "of I could jump out of the window or bash my head against the wall." However the pt denies intent  and claimed "I will be able to be save while in the hospital"    05/09/2022: Patient was seen today AM with EDUARDO, chart reviewed and discussed in team. He was transferred from [facility] to  as he tried to suffocate himself with a plastic bag on his head. He did at college and was BIB/Father at [facility] for Psychiatric Evaluation. He endorses that he is depressed since H. School and was on meds Zoloft 50 mg daily for a year and was under Dr. Khan, but had to stop due to insurance issues. he was not given any privileges at [facility] for his depression, he asked school guidance, but was not available. he is depressed, broke up a relationship a few days ago and was suicidal, he was give Zoloft 50 mg daily, felt OK but seems to need a higher dosages for further stability. No medical issues has limited OCD type Behavior. To assess tomorrow for any other mood or OCD type behavior in AM.    Plan: 1. Zoloft increased to Zoloft 75 mg daily          2. Ativan 0.5 mg x 1 dose for anxiety    05/10/2022: Patient was seen today AM with PING Epps, chart reviewed and discussed in team. Mood upset, as he was staying in the same room with patient JW. He was informed that room to be assigned after few patient leaves today, has limited sleep issues was advised that once the room was changed things would get back to normal. he has limited OCD symptoms e.g. stepping on middle of square sign when walking, limited food not to mix with other foods, but nothing time consuming or disturbing, Appetite seems OK ad to f/u as needed for stability/safety. Not S/h and no meds changes needed for today. Ativan 0.5 mg x 1 dose given today AM
Pt and his mother indicated that pt was "doing better with the zoloft 50mg but still not good enough."  Pt claims to still be with suicidal ideation "of I could jump out of the window or bash my head against the wall." However the pt denies intent  and claimed "I will be able to be save while in the hospital"    05/09/2022: Patient was seen today AM with SW, chart reviewed and discussed in team. He was transferred from [facility] to  as he tried to suffocate himself with a plastic bag on his head. He did at college and was BIB/Father at [facility]. He endorses that he is depressed since H. School and was on meds Zoloft 50 mg daily for a year and was under Dr. Khan, but had to stop due to insurance issues. he was not given any privileges at [facility] for his depression, he asked school guidance, but was not available. he is depressed, broke up a relationship a few days ago and was suicidal, he was give Zoloft 50 mg daily, felt OK but seems to need a higher dosages for further stability. No medical issues has limited OCD type Behavior. To assess tomorrow for any other mood or OCD type behavior in AM.    Plan: 1. Zoloft increased to Zoloft 75 mg daily          2. Ativan 0.5 mg x 1 dose for anxiety
Pt and his mother indicated that pt was "doing better with the zoloft 50mg but still not good enough."  Pt claims to still be with suicidal ideation "of I could jump out of the window or bash my head against the wall." However the pt denies intent  and claimed "I will be able to be save while in the hospital"    05/09/2022: Patient was seen today AM with EDUARDO, chart reviewed and discussed in team. He was transferred from [facility] to  as he tried to suffocate himself with a plastic bag on his head. He did at college and was BIB/Father at [facility] for [facility]. He endorses that he is depressed since H. School and was on meds Zoloft 50 mg daily for a year and was under Dr. Khan, but had to stop due to insurance issues. he was not given any privileges at [facility] for his depression, he asked school guidance, but was not available. he is depressed, broke up a relationship a few days ago and was suicidal, he was give Zoloft 50 mg daily, felt OK but seems to need a higher dosages for further stability. No medical issues has limited OCD type Behavior. To assess tomorrow for any other mood or OCD type behavior in AM.    Plan: 1. Zoloft increased to Zoloft 75 mg daily          2. Ativan 0.5 mg x 1 dose for anxiety    05/10/2022: Patient was seen today AM with PING Epps, chart reviewed and discussed in team. Mood upset, as he was staying in the same room with patient JW. He was informed that room to be assigned after few patient leaves today, has limited sleep issues was advised that once the room was changed things would get back to normal. he has limited OCD symptoms e.g. stepping on middle of square sign when walking, limited food not to mix with other foods, but nothing time consuming or disturbing, Appetite seems OK ad to f/u as needed for stability/safety. Not S/h and no meds changes needed for today. Ativan 0.5 mg x 1 dose given today AM    05/11/2022: Patient was seen today AM, chart reviewed and discussed in team. He endorses that he is responding well to the increased dosages of Zoloft 75 mg /day. He has good sleep/appetite, feels better with increasing confidence and optimism. Discussed the option of Zoloft s/e and is aware of the meds induced s/e and also informed the duration of Anti-depressants to be taken initially. Spoke with his mother yesterday and to see her today during visiting hours.
Pt and his mother indicated that pt was "doing better with the zoloft 50mg but still not good enough."  Pt claims to still be with suicidal ideation "of I could jump out of the window or bash my head against the wall." However the pt denies intent  and claimed "I will be able to be save while in the hospital"

## 2022-05-13 NOTE — DISCHARGE NOTE BEHAVIORAL HEALTH - NSBHDCADDR1FT_A_CORE
[address] [place] [address]    You have an IN-PERSON intake scheduled for Tues. 5/17 at 8am at [facility]. You will be in the telehealth program until availability opens for the in-person. You should discuss your preference for in-person appointments at your intake. [address] [place] [address]    You have an IN-PERSON intake scheduled for Tues. 5/17 at 8am at [facility]. You will be in the telehealth program until availability opens for the in-person. You should discuss if you prefer in-person appointments at your intake.     Your treatment team will refer you to your next level of care once ready for discharge from the program.

## 2022-05-13 NOTE — DISCHARGE NOTE BEHAVIORAL HEALTH - REASON FOR ADMISSION
Suicidal Ideation  "Juanita been suffering for really bad depression, with constant thoughts of suicide."  Patient is 22 year old male, single, domiciled with parents, student at [employer] lives on campus, presented to [facility] with worsening depression and suicidality and transferred to  for inpatient mental health treatment.

## 2022-05-13 NOTE — DISCHARGE NOTE BEHAVIORAL HEALTH - HPI (INCLUDE ILLNESS QUALITY, SEVERITY, DURATION, TIMING, CONTEXT, MODIFYING FACTORS, ASSOCIATED SIGNS AND SYMPTOMS)
Per PAL diggs "Patient is 22 year old male, single, domiciled with parents, student at [employer] lives on campus. PPHX of depression and anxiety, no prior psychiatric hospitalizations, no suicide attempt; no legal hx, no hx of violence; no significant PMHX. Patient presented to [facility] with worsening depression and suicidality. Psychiatry consulted for assessment of mood symptoms. Patient was seen and evaluated in emergency room, mother at bedside. Patient interviewed in private, he presents calm and cooperative, well related, superficially pleasant and engaged in interview. Patient reports that he has had worsening depression over the past several months in the context of increased external stressors. Most recently he has been having "constant suicidal ideations, I am always thinking of ways I could die, like I will see a train and then see myself getting hit by it." He reports last night he had a self-aborted suicide attempt in which he impulsively put a plastic bag over his face and pulled it tightly around his neck, no loss of consciousness. He reports that he "felt relief for the first time." He goes on to note that he believes he was having a panic attack at the time in which he placed the bag over his face and denies that he had plan to do such. He is can not elaborate on why he decided to discontinue suicide attempt, but her reports that he did notify his outpatient therapist who instructed him to notify his parents.     He reports worsening depressive mood features including isolative behaviors while he is at school, with limited psychosocial support. He notes poor school performance "horribly" with low motivation for ADLs and tasks, he complains of poor concentration, anhedonia, and difficulty with sleep latency, with suicidality. He states he participated in outpatient therapy but his psychiatrist who had been treating him with Zoloft 50mg x 1 year stopped excepting his insurance and gave him a 3 month supply until he found another provider. He reports he ran out of medication last week and was only seen for a brief intake with a psychiatrist, no medications were prescribed and with pending intake paperwork. He reports worsening mood features in the context of breakup with GF after 2.5 year relationship, he sites her as being his main source of support. He reports he has ruminating thoughts and increased worries, finding them difficult to control. He admits to having intermittent panic attacks. Patient admits to increasing cannabis use, reporting that it helps him feel more relaxed. He denies all other illicit drug use. Denies ETOH use. Appetite is unremarkable. Patients denies subjective manic features. NO AVH, no delusions or paranoia elicited. He can not identify protective factors and reports that he doesn't really see himself in the future and has no future plans. Patient is requesting inpatient psychiatric hospitalization for stabilization of mood symptoms.  Spoke with mother Cindy who reports that she had to send his father to pick him up from school after getting a call from him regarding worsening depression. She collaborates with the above statement regarding his psychiatric treatment history. She reports there were no development abnormalities, patient met developmental milestones appropriately. He did well in school and graduated at the top of his class, but regards he has been having a difficult time at college with limited support from peers. She is in agreement that patient would benefit from inpatient treatment for depression." Per PAL diggs "Patient is 22 year old male, single, domiciled with parents, student at [employer] lives on campus. PPHX of depression and anxiety, no prior psychiatric hospitalizations, no suicide attempt; no legal hx, no hx of violence; no significant PMHX. Patient presented to [facility] with worsening depression and suicidality. Psychiatry consulted for assessment of mood symptoms. Patient was seen and evaluated in emergency room, mother at bedside. Patient interviewed in private, he presents calm and cooperative, well related, superficially pleasant and engaged in interview. Patient reports that he has had worsening depression over the past several months in the context of increased external stressors. Most recently he has been having "constant suicidal ideations, I am always thinking of ways I could die, like I will see a train and then see myself getting hit by it." He reports last night he had a self-aborted suicide attempt in which he impulsively put a plastic bag over his face and pulled it tightly around his neck, no loss of consciousness. He reports that he "felt relief for the first time." He goes on to note that he believes he was having a panic attack at the time in which he placed the bag over his face and denies that he had plan to do such. He is can not elaborate on why he decided to discontinue suicide attempt, but her reports that he did notify his outpatient therapist who instructed him to notify his parents.     He reports worsening depressive mood features including isolative behaviors while he is at school, with limited psychosocial support. He notes poor school performance "horribly" with low motivation for ADLs and tasks, he complains of poor concentration, anhedonia, and difficulty with sleep latency, with suicidality. He states he participated in outpatient therapy but his psychiatrist who had been treating him with Zoloft 50mg x 1 year stopped excepting his insurance and gave him a 3 month supply until he found another provider. He reports he ran out of medication last week and was only seen for a brief intake with a psychiatrist, no medications were prescribed and with pending intake paperwork. He reports worsening mood features in the context of breakup with GF after 2.5 year relationship, he sites her as being his main source of support. He reports he has ruminating thoughts and increased worries, finding them difficult to control. He admits to having intermittent panic attacks. Patient admits to increasing cannabis use, reporting that it helps him feel more relaxed. He denies all other illicit drug use. Denies ETOH use. Appetite is unremarkable. Patients denies subjective manic features. NO AVH, no delusions or paranoia elicited. He can not identify protective factors and reports that he doesn't really see himself in the future and has no future plans. Patient is requesting inpatient psychiatric hospitalization for stabilization of mood symptoms.  Spoke with mother Cindy who reports that she had to send his father to pick him up from school after getting a call from him regarding worsening depression. She collaborates with the above statement regarding his psychiatric treatment history. She reports there were no development abnormalities, patient met developmental milestones appropriately. He did well in school and graduated at the top of his class, but regards he has been having a difficult time at college with limited support from peers. She is in agreement that patient would benefit from inpatient treatment for depression."    Interval Hx: Patient was admitted In[facility]untarily from [facility]. He was on Zoloft 50 mg daily, later Zoloft was increased to Zoloft 75 mg/day and was also on Melatonin 3 mg HS as he was not sleeping well. He improved soon enough as he was up and about in the unit. Mood seems OK, not S/H and there were no instances of Suicidal ideation or self Mutilation. He was joined in groups with other patients and ready to go to [facility] starting 05/17/2022.

## 2022-05-13 NOTE — DISCHARGE NOTE BEHAVIORAL HEALTH - NSBHDCADDR2FT_A_CORE
[address] [place] [address]  Ph. [phone] F. [phone]  **To be referred to once ready to transition from [facility] program.

## 2022-05-13 NOTE — PROGRESS NOTE BEHAVIORAL HEALTH - NSBHPTASSESSDT_PSY_A_CORE
11-May-2022 11:53
08-May-2022 13:24
12-May-2022 12:47
10-May-2022 13:00
09-May-2022 12:58
13-May-2022 12:13

## 2022-05-13 NOTE — DISCHARGE NOTE BEHAVIORAL HEALTH - NSBHDCCRISISPLAN2FT_PSY_A_CORE
[facility] 5N: [phone]  EDUARDO- Sarah Sharpe [facility]  Psychiatrist- Dr. Kathy Zuleta MD

## 2022-05-13 NOTE — DISCHARGE NOTE BEHAVIORAL HEALTH - PAST PSYCHIATRIC HISTORY
PPHx of depression and anxiety w/ outpt. tx., no prior inpt. admits, hx. of SI, no prior SA, no hx. aggression/violence.

## 2022-05-13 NOTE — DISCHARGE NOTE BEHAVIORAL HEALTH - NSBHDCMEDSFT_PSY_A_CORE
Pt. directed to take all medications as prescribed and to not start or stop any medications unless told to do so by prescribing provider.

## 2022-05-13 NOTE — DISCHARGE NOTE BEHAVIORAL HEALTH - CONDITIONS AT DISCHARGE
Patient was seen and evaluated by treatment team and is discharged.  He is alert, ambulatory; no distress noted nor voiced by patient; he denies current suicidal/homicidal ideation, and denies auditory/visual hallucination.  Discharge and follow-up instructions explained and given to him and he verbalized understanding.  All belongings returned to him and he is dressed appropriately for d/c.  Patient currently awaiting ride for safety.

## 2022-05-13 NOTE — DISCHARGE NOTE BEHAVIORAL HEALTH - NSBHDCSUBSTHXFT_PSY_A_CORE
Reported recent daily marijuana use as self-medication, denied /abuse/dependence, UTOX+ for marijuana.

## 2022-05-13 NOTE — PROGRESS NOTE BEHAVIORAL HEALTH - THOUGHT PROCESS
Linear/Impaired reasoning

## 2022-05-13 NOTE — DISCHARGE NOTE BEHAVIORAL HEALTH - NS TRANSFER PATIENT BELONGINGS
returned to patient/Other belongings/Clothing wallet, $109, learner permit, [facility] direct card, 2 gift cards, scotch tapes, pen, cig lighter/Other belongings/Clothing

## 2022-05-13 NOTE — DISCHARGE NOTE BEHAVIORAL HEALTH - NSBHDCRESOURCESOTHERFT_PSY_A_CORE
WENDY DASH- for psychiatric crises (available AFTER HOURS)  24/7 hotline: [phone]  Address:  [address], [place] [address]

## 2022-05-13 NOTE — PROGRESS NOTE BEHAVIORAL HEALTH - RISK ASSESSMENT
low risk  acute anxiety  suicidal ideation   mitigating denies any intent or plan   protective: support of family   chronic; several walk in visits , use of cannabis

## 2022-05-13 NOTE — PROGRESS NOTE BEHAVIORAL HEALTH - NSBHCHARTREVIEWVS_PSY_A_CORE FT
Vital Signs Last 24 Hrs  T(C): 36.8 (10 May 2022 07:41), Max: 36.8 (10 May 2022 07:41)  T(F): 98.2 (10 May 2022 07:41), Max: 98.2 (10 May 2022 07:41)  HR: --  BP: --  BP(mean): --  RR: 18 (10 May 2022 07:41) (18 - 18)  SpO2: 99% (10 May 2022 07:41) (99% - 99%)
Vital Signs Last 24 Hrs  T(C): --  T(F): --  HR: --  BP: --  BP(mean): --  RR: --  SpO2: --
Vital Signs Last 24 Hrs  T(C): 36.7 (09 May 2022 07:59), Max: 36.7 (09 May 2022 07:59)  T(F): 98 (09 May 2022 07:59), Max: 98 (09 May 2022 07:59)  HR: --  BP: --  BP(mean): --  RR: 18 (09 May 2022 07:59) (18 - 18)  SpO2: 100% (09 May 2022 07:59) (100% - 100%)
Vital Signs Last 24 Hrs  T(C): 36.6 (13 May 2022 08:26), Max: 36.6 (13 May 2022 08:26)  T(F): 97.9 (13 May 2022 08:26), Max: 97.9 (13 May 2022 08:26)  HR: --  BP: --  BP(mean): --  RR: 16 (13 May 2022 08:26) (16 - 16)  SpO2: 100% (13 May 2022 08:26) (100% - 100%)
Vital Signs Last 24 Hrs  T(C): 36.8 (12 May 2022 08:00), Max: 36.8 (12 May 2022 08:00)  T(F): 98.2 (12 May 2022 08:00), Max: 98.2 (12 May 2022 08:00)  HR: --  BP: --  BP(mean): --  RR: 16 (12 May 2022 08:00) (16 - 16)  SpO2: 100% (12 May 2022 08:00) (100% - 100%)
Vital Signs Last 24 Hrs  T(C): 36.7 (08 May 2022 12:05), Max: 37 (07 May 2022 23:36)  T(F): 98.1 (08 May 2022 12:05), Max: 98.6 (07 May 2022 23:36)  HR: 77 (08 May 2022 12:05) (50 - 77)  BP: 110/67 (08 May 2022 12:05) (97/59 - 120/64)  BP(mean): --  RR: 18 (08 May 2022 12:05) (17 - 19)  SpO2: 97% (08 May 2022 12:05) (96% - 99%)

## 2022-05-13 NOTE — PROGRESS NOTE BEHAVIORAL HEALTH - PROBLEM SELECTOR PLAN 1
1. Start Zoloft 50 mg daily and titrate as needed for stability, now on Zoloft 75 mg/day for effective Mood control.
1. Start Zoloft 50 mg daily and titrate as needed for stability

## 2022-05-13 NOTE — DISCHARGE NOTE BEHAVIORAL HEALTH - MEDICATION SUMMARY - MEDICATIONS TO TAKE
I will START or STAY ON the medications listed below when I get home from the hospital:    sertraline 25 mg oral tablet  -- 3 tab(s) by mouth once a day x 30 days   -- Indication: For Mood    melatonin 5 mg oral tablet  -- 1 tab(s) by mouth once a day (at bedtime) x 30 days, As Needed -Insomnia   -- Indication: For Sleep

## 2022-05-18 DIAGNOSIS — R45.851 SUICIDAL IDEATIONS: ICD-10-CM

## 2022-05-18 DIAGNOSIS — F33.2 MAJOR DEPRESSIVE DISORDER, RECURRENT SEVERE WITHOUT PSYCHOTIC FEATURES: ICD-10-CM

## 2022-05-18 DIAGNOSIS — F41.9 ANXIETY DISORDER, UNSPECIFIED: ICD-10-CM

## 2022-05-18 DIAGNOSIS — F12.99 CANNABIS USE, UNSPECIFIED WITH UNSPECIFIED CANNABIS-INDUCED DISORDER: ICD-10-CM

## 2022-10-05 PROBLEM — Z86.59 PERSONAL HISTORY OF OTHER MENTAL AND BEHAVIORAL DISORDERS: Chronic | Status: ACTIVE | Noted: 2022-05-07

## 2022-10-12 ENCOUNTER — APPOINTMENT (OUTPATIENT)
Dept: PSYCHIATRY | Facility: CLINIC | Age: 22
End: 2022-10-12

## 2022-10-12 DIAGNOSIS — Z81.8 FAMILY HISTORY OF OTHER MENTAL AND BEHAVIORAL DISORDERS: ICD-10-CM

## 2022-10-12 PROCEDURE — 99205 OFFICE O/P NEW HI 60 MIN: CPT | Mod: 95

## 2022-10-12 NOTE — PSYCHOSOCIAL ASSESSMENT
[Yes, during lifetime] : Yes, during lifetime [Use within last 30 days] : use within last 30 days [All substances negative except as specified below] : All substances negative except as specified below [_____] : Last Use: [unfilled]  [Yes (add details)] : Patient attended school, home tutoring, or received education instruction at anytime in the past three months? Yes [FreeTextEntry3] : limited social spheres [FreeTextEntry1] : NA [Unemployed and not looking for work] : unemployed and not looking for work [Student] : student [Dependent on guardian] : dependent on guardian [Other people unrelated to client] : Other people unrelated to client [No] : Patient has personal representation (legal guardian, representative payee, conservatorship)? No

## 2022-10-12 NOTE — PHYSICAL EXAM
[None] : none [Cooperative] : cooperative [Depressed] : depressed [Anxious] : anxious [Constricted] : constricted [Clear] : clear [Linear/Goal Directed] : linear/goal directed [Depressive] : depressive [Average] : average [WNL] : within normal limits [Not applicable] : not applicable [4 - Moderately ill] : 4 - Moderately ill  (overt symptoms causing noticeable, but modest, functional impairment or distress; symptom level may warrant medication) [0] : 0

## 2022-10-12 NOTE — DISCUSSION/SUMMARY
[FreeTextEntry1] : recurrent depression with anxious distress presently stable however recent suicidal urges and behaviors are of concern Numerous features suggestive of obsessional ruminations, intrusive ego dystonic thoughts, and compulsive behaviors deserve more exploration and consideration in terms of pharmacologic options "Autism"  consideration seems dubious\par safety plan reviewed with patient

## 2022-10-12 NOTE — RISK ASSESSMENT
[Clinical Interview] : Clinical Interview [In last 30 days] : in the last 30 days [Yes] : Yes [(4) Daily or almost daily] : Frequency: How many times have you had these thoughts? Daily or almost daily [(1) Fleeting - few seconds/minutes] : Fleeting - a few seconds or minutes [(2) Can control thoughts with little difficulty] : Can control thoughts with little difficulty [(1) Deterrents definitely stopped you from attempting suicide] : Deterrents definitely stopped you from attempting suicide [(4) Mostly to end or stop the pain (you couldn't go on living with the pain or how you were feeling)] : Mostly to end or stop the pain (you couldn't go on living with the pain or how you were feeling) [Mood disorder] : mood disorder [Depressed mood/Anhedonia] : depressed mood/anhedonia [Hopelessness or despair] : hopelessness or despair [Family Hx of psychiatric diagnoses requiring hospitalization] : family history of psychiatric diagnoses requiring hospitalization [Change in provider or treatment (i.e., medications, psychotherapy, milieu)] : change in provider or treatment (i.e., medications, psychotherapy, milieu) [Current or pending social isolation] : current or pending social isolation [Identifies reasons for living] : identifies reasons for living [Responsibility to children, family, or others] : responsibility to children, family, or others [Engaged in work or school] : engaged in work or school [TextBox_32] : wrapped cord around neck 2 weeks ago but reached out to therapist presently resolved with med increase [FreeTextEntry5] : engaged in [facility] [FreeTextEntry6] : feels able to contract for safety  [None in the patient's lifetime] : None in the patient's lifetime [None Known] : none known [No known risk factors] : No known risk factors

## 2022-10-12 NOTE — HISTORY OF PRESENT ILLNESS
[Suicidal Behavior/Ideation] : suicidal behavior/ideation [FreeTextEntry1] : for past few years struggles w depression and anxiety Symptoms include problems with completion of daily tasks poor appetite stress dealing with people "Negative intrusive" "Images of death dying or suicide" symptoms fluctuate Over summer was largely suppressed but last few weeks may occur several times per day Uses cannabis frequently "self medicating"\par recent suicidal behaviors and urges None presently\par close friends from  however less so away at school  Had a relationship breakup 6 months ago\par 2 weeks ago Latuda 60 mg was added Sertraline 150 mg (was increased ) Wellbutrin  mg daily (started one month ago)\par feels appetite is less weight seems stable weighs = 154\par Wonders about possible "autism" diagnosis is very particular about sensations , has compulsive behaviors, has difficulty focusing [FreeTextEntry2] : [facility] 2021 admitted after aborted attempt to hang self was admitted for 5 days started on sertraline and therapy\par Sees Dr Crow Lynn \par past treatment included mirtazapine

## 2022-10-12 NOTE — REASON FOR VISIT
[Other Location: e.g. School (Enter Location, City,State)___] : at [unfilled], at the time of the visit. [Medical Office: ([facility])___] : at the medical office located in  [Patient] : the patient [Number can be texted] : number can be texted [OK  to leave message] : OK  to leave message [FreeTextEntry3] : [email or web address] [Self-Referred] : Self-Referred [FreeTextEntry1] : on medication needs new psychiatrist

## 2023-01-12 ENCOUNTER — APPOINTMENT (OUTPATIENT)
Dept: PSYCHIATRY | Facility: CLINIC | Age: 23
End: 2023-01-12
Payer: COMMERCIAL

## 2023-01-12 PROCEDURE — 99215 OFFICE O/P EST HI 40 MIN: CPT

## 2023-01-12 RX ORDER — SERTRALINE HYDROCHLORIDE 100 MG/1
100 TABLET, FILM COATED ORAL DAILY
Qty: 45 | Refills: 1 | Status: DISCONTINUED | COMMUNITY
Start: 2022-10-12 | End: 2023-01-12

## 2023-01-12 RX ORDER — LURASIDONE HYDROCHLORIDE 40 MG/1
40 TABLET, FILM COATED ORAL DAILY
Refills: 0 | Status: DISCONTINUED | COMMUNITY
Start: 2022-10-12 | End: 2023-01-12

## 2023-01-12 RX ORDER — BUPROPION HYDROCHLORIDE 150 MG/1
150 TABLET, EXTENDED RELEASE ORAL
Qty: 30 | Refills: 2 | Status: DISCONTINUED | COMMUNITY
Start: 2022-10-12 | End: 2023-01-12

## 2023-01-12 NOTE — DISCUSSION/SUMMARY
[FreeTextEntry1] : discussed options for treatment including therapy\par discussed safety plan\par medication options discussed\par safety plan reviewed as if suicidal thoughts or urges develop patient is to seek assistance, call 911 or go to nearest hospital emergency room\par patient encouraged to write more examples of his unique "habits"\par f/u in 3 wks

## 2023-01-12 NOTE — PLAN
[No] : No [Medication education provided] : Medication education provided. [Rationale for medication choices, possible risks/precautions, benefits, alternative treatment choices, and consequences of non-treatment discussed] : Rationale for medication choices, possible risks/precautions, benefits, alternative treatment choices, and consequences of non-treatment discussed with patient/family/caregiver  [Order(s) for medication placed in [place] EHR] : Medication

## 2023-01-12 NOTE — HISTORY OF PRESENT ILLNESS
[FreeTextEntry1] : stopped medications 2 months ago as he felt no benefit "I weaned off - didn't feel any effect going off"\ifeanyi Reports daily episode of "crying as a relief" chronic passive suicidal ideation "didn't change much'\ifeanyi Has a private therapist\ifeanyi wishes to maintain me in "safety plan"\ifeanyi is preferring not to take meds and admits to erratic compliance with these meds in past\ifeanyi discussed his sensitivity to close quarters\ifeanyi also is comforted by sensation of adhesive tape which he "always carries"\ifeanyi  [FreeTextEntry2] : past history reveiwed\par Admitted to  after abortive suicidal attempt

## 2023-01-12 NOTE — PHYSICAL EXAM
[None] : none [Cooperative] : cooperative [Depressed] : depressed [Anxious] : anxious [Full] : full [Clear] : clear [Linear/Goal Directed] : linear/goal directed [Preoccupations/Ruminations] : preoccupations/ruminations [Self-Deprecatory] : self-deprecatory [Average] : average [WNL] : within normal limits [4 - Moderately ill] : 4 - Moderately ill  (overt symptoms causing noticeable, but modest, functional impairment or distress; symptom level may warrant medication)

## 2023-01-12 NOTE — RISK ASSESSMENT
[Yes, patient reports ideation or behavior] : Yes, patient reports ideation or behavior [No, patient denies ideation or behavior] : No, patient denies ideation or behavior [Mood disorder] : mood disorder [Depressed mood/Anhedonia] : depressed mood/anhedonia [Non-compliant or not receiving treatment] : non-compliant or not receiving treatment [Change in provider or treatment (i.e., medications, psychotherapy, milieu)] : change in provider or treatment (i.e., medications, psychotherapy, milieu) [Hopeless about or dissatisfied with provider or treatment] : hopeless about or dissatisfied with provider or treatment [None known] : None known [FreeTextEntry8] : recurrent passive thoughts No urges or plans

## 2023-02-02 ENCOUNTER — APPOINTMENT (OUTPATIENT)
Dept: PSYCHIATRY | Facility: CLINIC | Age: 23
End: 2023-02-02
Payer: COMMERCIAL

## 2023-02-02 VITALS — HEIGHT: 70 IN | BODY MASS INDEX: 20.04 KG/M2 | WEIGHT: 140 LBS

## 2023-02-02 PROCEDURE — 99214 OFFICE O/P EST MOD 30 MIN: CPT | Mod: 95

## 2023-02-02 NOTE — HISTORY OF PRESENT ILLNESS
[FreeTextEntry1] : doing "OK" more active leaving house more going to gym and seeing people More "Intrusive thoughts" "Images of being killed or suicide" \par "for exaMPLE DROVE PAST A [place] AND THOUGHTS ABOUT JUMPING IN"\par taking medication daily No side effects less emotional and seems more level\par

## 2023-02-02 NOTE — RISK ASSESSMENT
[Yes, patient reports ideation or behavior] : Yes, patient reports ideation or behavior [No, patient denies ideation or behavior] : No, patient denies ideation or behavior [No] : No [Yes, more than three months ago] : Yes, more than three months ago [Low acute suicide risk] : Low acute suicide risk [Yes] : Safety Plan completed/updated (for individuals at risk): Yes [TextBox_32] : last May 2022 put sheet around neck then admitted to hospital

## 2023-02-02 NOTE — PHYSICAL EXAM
[None] : none [Cooperative] : cooperative [Depressed] : depressed [Full] : full [Clear] : clear [Linear/Goal Directed] : linear/goal directed [Preoccupations/Ruminations] : preoccupations/ruminations [Obsessional] : obsessional [Depressive] : depressive [None Reported] : none reported [Average] : average [WNL] : within normal limits [de-identified] : intrusive thoughts about suicide but no intent or plan

## 2023-02-02 NOTE — DISCUSSION/SUMMARY
[FreeTextEntry1] : discussed rationale for med increase \par discussed safety plan including 911 or [phone] \par encouraged continued participation in weekly therapy sessions\par future options are OCD medications\par f/u in 2-3 wks

## 2023-02-03 NOTE — CHART NOTE - NSCHARTNOTEFT_GEN_A_CORE
Writer contacted patient to follow up after discharge. Direct contact made with patient who reported that he made it home safely. Denies SI/HI, in no acute distress. Excision Method: Elliptical

## 2023-03-15 ENCOUNTER — APPOINTMENT (OUTPATIENT)
Dept: PSYCHIATRY | Facility: CLINIC | Age: 23
End: 2023-03-15
Payer: COMMERCIAL

## 2023-03-15 PROCEDURE — 99214 OFFICE O/P EST MOD 30 MIN: CPT | Mod: 95

## 2023-03-15 NOTE — HISTORY OF PRESENT ILLNESS
[FreeTextEntry1] : MELVIN  states He "Medication has been very helpful."\par No new medical issues, no new medication since last visit\par He is currently engaged in College at [employer], studying education and bio; he is enjoying school but often feels overwhelmed by workload; 3/4 classes performing well.\par Reports adherence to med regimen; tolerating well, no reported side effects or complications.\par Stable, improvement in motivation and energy, overall mood; Report fleeting death wishes, occurring less often than weekly.  Denies SIIP/HIIP/NSSIB.\par Remains with Intrusive thoughts and images occur several times daily, they are brief, he is able to control them. "Images of me dying in someway."Feels the frequency and duration has diminished since starting medication. Denies worsening feelings of anxiety/panic\par Appetite; chronically low; but reports he has been able to make food and go out to the grocery store. \par HT 5'10 Last weight 135.1 lbs ** on 3/7/23\par Reports sleep is intermittent; typically has difficult time falling asleep early, bedtime is between 12-4 am wakes between 9-10am, occasionally will utilize Melatonin for sleep.\par Denies excess ETOH use/abuse.\par 3-4x weekly cannabis use; typically prior to bedtime\par

## 2023-03-15 NOTE — DISCUSSION/SUMMARY
[FreeTextEntry1] : MELVIN MASON is a22 year male was seen and evaluated. Patient is AOX4, calm, cooperative, pleasant, well-related and engaged in the interview. MELVIN  reports hx of MDE and Anxiety with hx of intrusive and distressing images. He is RX by Psychiatrist Dutch Lamar, currently on Venlafaxine tolerating well, with good efficacy, no reported SE. He denies new/worsening mood or anxiety sx, No SIIP/HIIP/ NSSIB, denies sx  suggestive of ehsan, hypomania, psychosis. Will continue the current medication regime without changes. Patient remains engaged in weekly outpatient psychotherapy.\par \par Discussed with patient her/his diagnosis, treatment, alternative to recommended treatment, risks v. benefits of treatment and no treatment and alternative treatment options. Medication recommended to MELVIN  was reviewed including indication, therapeutic benefits, common side effects, drug-drug interactions, reportable complications, risk of combining with alcohol/ illicit drugs, risk of discontinuation side-effects if medication is abruptly discontinued, and black-box warnings. MELVIN  returned verbalization of education provided.\par \par Holistic modalities to enhance mood and reduce feelings of anxiety and depression were reviewed; including the importance of nutrition and hydration, eating healthy meals at regular intervals with food rich in nutrients that support brain function and overall health. The importance of nutritional supplements/macronutrients and taking a daily multivitamin. The patient was encouraged to engage in good sleep hygiene routines. The benefits of daily exercise, creative activities, leisure activities, family/peer support, and utilizing mindfulness strategies \par \par \par ISTOP checked; Rx in past 12 months: Reference #:[id number]; No controlled substances\par

## 2023-03-15 NOTE — PLAN
[Yes. details: ___] : Yes, [unfilled] [Medication education provided] : Medication education provided. [Rationale for medication choices, possible risks/precautions, benefits, alternative treatment choices, and consequences of non-treatment discussed] : Rationale for medication choices, possible risks/precautions, benefits, alternative treatment choices, and consequences of non-treatment discussed with patient/family/caregiver  [FreeTextEntry4] : Psychoeducation and supportive therapy provided, and discussed rationale for the recommended medications\par Sleep hygiene education provided\par Medication:\par continue Venlafaxine\par Educated patient of importance of remaining abstinent from drugs and alcohol\par Emergency procedures were discussed: Pt educated to call [phone], 911 or go to the nearest ER for worsening symptoms/suicidal/homicidal ideations\par Continue individual psychotherapy to ongoing therapeutic benefits/learn coping skills\par RTC in  2 month or earlier as needed, please call the office to schedule follow up [phone]\par Patient give the opportunity to ask questions and review treatment; patient expressed understanding and agreement with the above plan.\par \par \par I spent a total of 30 minutes for today's visit in evaluating and treating the patient as per above, including: preparing for patient's appointment (review of prior documents, [facility] ), performing a medically necessary exam/evaluation, communicating/counseling/educating the patient ordering medications, documenting clinical information in the EHR\par

## 2023-03-15 NOTE — RISK ASSESSMENT
[Yes, patient reports ideation or behavior] : Yes, patient reports ideation or behavior [No, patient denies ideation or behavior] : No, patient denies ideation or behavior [Yes] : Yes [Low acute suicide risk] : Low acute suicide risk [No] : No [FreeTextEntry8] : Denies active SIIP [FreeTextEntry3] : Patient will utilize Emergency resources if sx of mood/ anxiety worsen or persist or is having thoughts of suicide

## 2023-03-15 NOTE — REASON FOR VISIT
[Patient preference] : as per patient preference [Telehealth (audio & video) - Individual/Group] : This visit was provided via telehealth using real-time 2-way audio visual technology. [Medical Office: ([facility])___] : The provider was located at the medical office in [unfilled]. [Home] : The patient, [unfilled], was located at home, [unfilled], at the time of the visit. [Verbal consent obtained from patient/other participant(s)] : Verbal consent for telehealth/telephonic services obtained from patient/other participant(s) [Patient] : Patient [[facility]] : 8336 [FreeTextEntry5] : 9577 [FreeTextEntry1] : MELVIN MASON is a 22 year old M,  being seen today for a follow up visit for medication management\par

## 2023-05-16 ENCOUNTER — APPOINTMENT (OUTPATIENT)
Dept: PSYCHIATRY | Facility: CLINIC | Age: 23
End: 2023-05-16

## 2023-06-05 ENCOUNTER — APPOINTMENT (OUTPATIENT)
Dept: PSYCHIATRY | Facility: CLINIC | Age: 23
End: 2023-06-05
Payer: COMMERCIAL

## 2023-06-05 VITALS
OXYGEN SATURATION: 96 % | BODY MASS INDEX: 19.61 KG/M2 | HEART RATE: 97 BPM | TEMPERATURE: 97.6 F | DIASTOLIC BLOOD PRESSURE: 71 MMHG | SYSTOLIC BLOOD PRESSURE: 128 MMHG | HEIGHT: 70 IN | RESPIRATION RATE: 17 BRPM | WEIGHT: 137 LBS

## 2023-06-05 DIAGNOSIS — F12.90 CANNABIS USE, UNSPECIFIED, UNCOMPLICATED: ICD-10-CM

## 2023-06-05 PROCEDURE — 99213 OFFICE O/P EST LOW 20 MIN: CPT

## 2023-06-05 NOTE — PLAN
[No] : No [Medication education provided] : Medication education provided. [Rationale for medication choices, possible risks/precautions, benefits, alternative treatment choices, and consequences of non-treatment discussed] : Rationale for medication choices, possible risks/precautions, benefits, alternative treatment choices, and consequences of non-treatment discussed with patient/family/caregiver  [Order(s) for medication placed in [facility] EHR] : Medication [FreeTextEntry5] : to take venlafaxine 75 plus 37.5 daily

## 2023-06-05 NOTE — HISTORY OF PRESENT ILLNESS
[FreeTextEntry1] : discussed completing semester switched major to biology from history\par uses weed 3-4 times per week reduces his anxiety\par intrusive thoughts occur but less frequently and very brief\par

## 2023-06-05 NOTE — PHYSICAL EXAM
[None] : none [Cooperative] : cooperative [Depressed] : depressed [Anxious] : anxious [Constricted] : constricted [Clear] : clear [Linear/Goal Directed] : linear/goal directed [Obsessional] : obsessional [Average] : average [WNL] : within normal limits

## 2023-06-27 ENCOUNTER — NON-APPOINTMENT (OUTPATIENT)
Age: 23
End: 2023-06-27

## 2023-07-04 ENCOUNTER — NON-APPOINTMENT (OUTPATIENT)
Age: 23
End: 2023-07-04

## 2023-08-02 ENCOUNTER — APPOINTMENT (OUTPATIENT)
Dept: PSYCHIATRY | Facility: CLINIC | Age: 23
End: 2023-08-02
Payer: COMMERCIAL

## 2023-08-02 VITALS — HEIGHT: 70 IN | BODY MASS INDEX: 20.04 KG/M2 | WEIGHT: 140 LBS

## 2023-08-02 PROCEDURE — 99213 OFFICE O/P EST LOW 20 MIN: CPT

## 2023-08-02 NOTE — HISTORY OF PRESENT ILLNESS
[FreeTextEntry1] : over last month mood has improved "I also got a new kitten" He has observed diminished emotionality with increase in dose. summer school and working at [employer] obsessions with "images" ARE DECREASED SIGNIFICANTLY compulsions about clothing same i.e. wearing socks inside out

## 2023-08-02 NOTE — DISCUSSION/SUMMARY
[FreeTextEntry1] : mood improved medication well tolerated discussed lack of progress in his therapy for last year and half will refer for psychotherapy here

## 2023-08-02 NOTE — PHYSICAL EXAM
[None] : none [Cooperative] : cooperative [Euthymic] : euthymic [Full] : full [Clear] : clear [Linear/Goal Directed] : linear/goal directed [Obsessional] : obsessional [Average] : average [WNL] : within normal limits

## 2023-08-09 NOTE — ED ADULT NURSE NOTE - NS_BELOGINGS_RETUNED_NAME_ED_ALL_ED_FT
[facility] Protopic Counseling: Patient may experience a mild burning sensation during topical application. Protopic is not approved in children less than 2 years of age. There have been case reports of hematologic and skin malignancies in patients using topical calcineurin inhibitors although causality is questionable.

## 2024-01-22 ENCOUNTER — APPOINTMENT (OUTPATIENT)
Dept: PSYCHIATRY | Facility: CLINIC | Age: 24
End: 2024-01-22
Payer: COMMERCIAL

## 2024-01-22 VITALS — WEIGHT: 145 LBS | HEIGHT: 70 IN | BODY MASS INDEX: 20.76 KG/M2

## 2024-01-22 PROCEDURE — 99213 OFFICE O/P EST LOW 20 MIN: CPT | Mod: 95

## 2024-01-22 RX ORDER — VENLAFAXINE HYDROCHLORIDE 37.5 MG/1
37.5 CAPSULE, EXTENDED RELEASE ORAL DAILY
Qty: 90 | Refills: 1 | Status: DISCONTINUED | COMMUNITY
Start: 2023-01-12 | End: 2024-01-22

## 2024-01-22 RX ORDER — VENLAFAXINE HYDROCHLORIDE 75 MG/1
75 CAPSULE, EXTENDED RELEASE ORAL
Qty: 90 | Refills: 1 | Status: DISCONTINUED | COMMUNITY
Start: 2023-06-05 | End: 2024-01-22

## 2024-01-22 RX ORDER — VENLAFAXINE HYDROCHLORIDE 75 MG/1
75 CAPSULE, EXTENDED RELEASE ORAL
Qty: 30 | Refills: 1 | Status: DISCONTINUED | COMMUNITY
Start: 2023-06-05 | End: 2024-01-22

## 2024-01-22 NOTE — DISCUSSION/SUMMARY
[FreeTextEntry1] : not on medications  discussed options and patient not interested in medication asking for psychotherapist to deal with ongoing issues related to anxiety patient aware of my residential will refer to NATALIE VIEIRA

## 2024-01-22 NOTE — REASON FOR VISIT
[Patient preference] : as per patient preference [Telehealth (audio & video) - Individual/Group] : This visit was provided via telehealth using real-time 2-way audio visual technology. [Medical Office: ([facility])___] : The provider was located at the medical office in [unfilled]. [Other Location: e.g. Home (Enter Location, City,State)___] : The patient, [unfilled], was located at [unfilled] at the time of the visit. [Verbal consent obtained from patient/other participant(s)] : Verbal consent for telehealth/telephonic services obtained from patient/other participant(s) [FreeTextEntry4] : 1036 [Patient] : Patient [FreeTextEntry1] : med management

## 2024-01-22 NOTE — HISTORY OF PRESENT ILLNESS
[FreeTextEntry1] : "I am doing better" depressive thoughts and disturbing "images" much reduced Anxiety remains problematic describes racing thoughts motor tension can increase anxiety when in social situations Is able to decompress somewhat stress regarding school involved with outreach to homeless no health issues not taking medications for last few months felt medication had blunting of emotions

## 2024-02-02 ENCOUNTER — APPOINTMENT (OUTPATIENT)
Dept: PSYCHIATRY | Facility: CLINIC | Age: 24
End: 2024-02-02
Payer: COMMERCIAL

## 2024-02-02 PROCEDURE — 90791 PSYCH DIAGNOSTIC EVALUATION: CPT | Mod: 95

## 2024-02-05 NOTE — PLAN
[Unable to complete assessment] : Unable to complete assessment [Every ___ week(s)] : Psychotherapy: Every [unfilled] week(s) [Individual Therapy] : Individual Therapy [FreeTextEntry3] : First intake, will continue to assess in 2nd intake [FreeTextEntry4] : Problem: Depression Goal: pt will engage in behavioral activation to improve mood with scheduling of activities on 5 out of 7 days a week

## 2024-02-05 NOTE — FAMILY HISTORY
Hypertrophic Cardiomyopathy Clinic    Date of Visit:  7/11/2019      History of Present Illness:   Ms. Marilee Lemons is a 23 year old female who presents today for a family history of hypertrophic cardiomyopathy in her father Urban who is also seen in our center.     Since her father does not harbor a genetic mutation it was advised that all first degree relatives should have a screening echo.  She reports that she is currently 22 weeks pregnant with a due date of November 19 th.     She reports symptoms since she was 18 of fatigue and shortness of breath with physical exertion or climbing 1 flight of stairs.  She was not able to participate in sports because of these symptoms.  She reports heaviness in her chest and shortness of breath which can occur with driving or sitting.  She also feels dizzy when she gets short of breath.  She denies orthopnea, PND, edema, near syncope or syncope.     Past Medical History:    No known problems                                             Hx of chlamydia infection                       01/2019       Marijuana abuse                                 05/2019         Comment: positive drug test in pregnancy    Anemia                                                        Vitamin D deficiency                                          Past Surgical History:    NO PAST SURGERIES                                             ALLERGIES: no known allergies.      Current Medications    PRENATAL VIT-DSS-FE FUM-FA (PRENATAL 19) TAB    Take 1 tablet by mouth daily.         Social History:   Lives with her mother and has a boyfriend.  She is a caregiver to the elderly.  She has never smoked cigarettes.  She used Marijuana prior to pregnancy.  No alcohol.  Soda prior to pregnancy. Not physically active.     Family History:   Father is alive and has HCM, S/P ASA.  Mother is alive and healthy.  5 siblings from her father, 2 siblings from her mother.  First pregnancy.     Review of Systems:    A 12-point review of systems was performed which was negative, except for those noted in the HPI above.     Physical Examination:   Visit Vitals  /62 (BP Location: RUE)   Pulse 98   Wt 52.2 kg   LMP 02/02/2019 (Exact Date)   SpO2 99%   BMI 18.56 kg/m²       General: Well groomed, well nourished in no acute distress.   Psychiatric: Awake, alert and oriented x 3. Mood and affect within normal limits.  Skin: Warm, pink and dry without evidence of rashes or lesions.   Neck:  Jugular venous pressure normal. Soft right venous hum present.  Carotid upstrokes are normal without bruits.   Cardiac: PMI is non-displaced. Normal intensity S1, S2. No murmur or gallop noted. Radial pulses +3/4 bilaterally. Dorsalis pedal pulses +3/4 bilaterally.   Pulmonary: Lungs clear to auscultation bilaterally. No rales, rhonchi or wheezing.   Abdomen: Soft, nontender, positive bowel sounds. No abdominal masses or bruit noted.   Extremities: No edema, cyanosis or clubbing.     Diagnostic Results:   Preliminary Echo 7/11/2019:   Normal LV cavity size and wall thickness, EF 60%.  GLS -21%. Myocardial crypts present. Increased LV apical trabeculations.  Mild prolapse of the posterior MV leaflet. Trace MR.  Elongated anterior mitral valve leaflet.  Normal RV size and systolic function, PASP 25 mmHg.  No pericardial effusion.      ECG 7/11/2019:  Sinus Tach, 102 bpm. CT interval 136 ms.  QTc 432 ms.      Labs 05/2019: Wbc 6, Hgb 7.6, Hct 27, Plt 266, Sodium 135, Potassium 4.3, Bun 9, Creatinine 0.48, Vitamin D 5.4.      Assessment and Plan:   1. Family history of HCM.   2. Normal LV cavity size and wall thickness, EF 60%.  GLS -21%.   3. Myocardial crypts present. Increased LV apical trabeculations.    4. Mild prolapse of the posterior MV leaflet. Trace MR.  Elongated anterior mitral valve leaflet.    5. Normal RV size and systolic function, PASP 25 mmHg.  6. Pregnancy state, 22 weeks.  7. Vitamin D deficiency.  Never started recommended  Vitamin D.  8. Anemia, Hgb 7.6.  Never started recommended Iron supplement.     We have discussed with the patient and her friend the results of the clinical evaluation and echocardiogram from today.  She does not have hypertrophic cardiomyopathy like her father however we do note some myocardial crypts and hypertrabeculated LV apex.  In addition she has mild prolapse of her posterior MV leaflet causing trace MR.     Given her tachycardia we will start Toprol XL 25 mg daily.  Her fatigue is related to her anemia and Vitamin D deficiency.  We have advised her to  the medications that were recommended and start promptly.  We will also call hematology and ask that she be seen to receive IV Iron.      She has been instructed to follow SBE prophylaxis given her MVP and trace MR.       All of their questions and concerns were addressed. She is encouraged to call the office with any additional concerns. We will plan on seeing her back in 1 year.  She should tolerate pregnancy without any problems.     WILLIE Cabrera    30 minutes face to face time spent with patient greater than 50% of time spent counseling and coordinating care.    History and Physical exam performed by WILLIE Cabrera, with the exception of the neck and cardiovascular exam which were performed by Dr. Garcia and scribed by WILLIE Cabrera.  The documentation recorded by the scribe accurately and completely reflects the service(s) I personally performed and the decisions made by me.     FELICIANO Garcia MD       CC:  MD Aleksey Pedroza MD Sana Jeffreys, MD   [FreeTextEntry1] : According to record, Father Depression.

## 2024-02-05 NOTE — SOCIAL HISTORY
[FreeTextEntry1] :     Fabien is a 24 y/o male. He attends [employer] and lives in off campus housing with friends.  He reports a good relationship with his friends, his family and his classmates. 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 He is a luis, currenting majoring in Biology.

## 2024-02-05 NOTE — REASON FOR VISIT
[Patient preference] : as per patient preference [Access issues (e.g., transportation, impaired mobility, etc.)] : due to patient's access issues [Telehealth (audio & video) - Individual/Group] : This visit was provided via telehealth using real-time 2-way audio visual technology. [Other Location: e.g. Home (Enter Location, City,State)___] : The patient, [unfilled], was located at [unfilled] at the time of the visit. [Verbal consent obtained from patient/other participant(s)] : Verbal consent for telehealth/telephonic services obtained from patient/other participant(s) [Private Practice - Psychiatrist] : Private Practice - Psychiatrist [[facility] Provider/Facility] : [facility] Provider/Facility [Patient] : Patient [FreeTextEntry4] : 11:00am [FreeTextEntry5] : 12:00pm

## 2024-02-05 NOTE — DISCUSSION/SUMMARY
[FreeTextEntry1] : Fabien is a 23-year-old male, presenting with depression and anxiety interested in individual therapy via telehealth to help better manage his depression and improve his functioning and feel less overwhelmed. He denies significant risk factors for self or other harm, and he is motivated for treatment. It is likely that cognitive behavioral therapy could be helpful to Fabien based on his diagnoses -Some goals he identified is learn how to cope with being overwhelmed, become more social, get through his schoolwork.

## 2024-02-05 NOTE — HISTORY OF PRESENT ILLNESS
[Not Applicable] : Not applicable [FreeTextEntry1] : Fabien presented for a 60 minute telehealth intake appointment, on time. Fabien is interested in individual psychotherapy (telehealth) to address reported symptoms of depression.  Fabien reports first experiencing symptoms of Depression in Middle school. He identifies his depression as constantly feeling overwhelmed, seeing distressing images, tearing up, lacking energy and lacking appetite.  He questions daily whether his life worth living. Fabien reports daily vivid distressing images of his Suicide, images including jumping off bridges, tall buildings, being shot in the head, tying sheets and placing plastic bags over his head. Fabien reports he has no need to fulfill these attempts and does not want to act on these images.  He has one previous psychiatric hospitalization in 2021 for 7 days due to increased Suicidal ideation. Fabien reports 7 SSRI trials and all were not effective and blunted any emotions he experienced.  Fabien has previously attended therapy with Dr Karly Dick for a year and a half; and short term therapy at his School, [employer].  He reports both were helpful until they were not. Fabien identified that he is curious if he meets the criteria for ASD, and has often discussed with previous providers if he meets the criteria. Fabien shared he struggles with feeling overwhelmed often and has numerous sensory sensitivities that are distressing to him.  Fabien is currently looking for therapy to better learn how to cope with feeling overwhelmed, learn how to be more social and help with getting through schoolwork.  [FreeTextEntry2] : Fabien denied history of HI, bipolar/ehsan/psychosis.  He has experienced SI in the past and has one psychiatric hospitalization in 2021 for one week. He has had individual psychotherapy in the past. He has a younger brother and sister, fraternal twins who are both in their freshmen year of college.  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 He is motivated for psychotherapy at this time and committed to weekly sessions with writer.   [FreeTextEntry3] : 7 Different trials of SSRI, did not like them, reports did not help and blunted emotions.

## 2024-02-09 ENCOUNTER — APPOINTMENT (OUTPATIENT)
Dept: PSYCHIATRY | Facility: CLINIC | Age: 24
End: 2024-02-09
Payer: COMMERCIAL

## 2024-02-09 DIAGNOSIS — F33.1 MAJOR DEPRESSIVE DISORDER, RECURRENT, MODERATE: ICD-10-CM

## 2024-02-09 DIAGNOSIS — F42.9 OBSESSIVE-COMPULSIVE DISORDER, UNSPECIFIED: ICD-10-CM

## 2024-02-09 DIAGNOSIS — F42.2 MIXED OBSESSIONAL THOUGHTS AND ACTS: ICD-10-CM

## 2024-02-09 PROCEDURE — 90791 PSYCH DIAGNOSTIC EVALUATION: CPT | Mod: 95

## 2024-02-09 NOTE — REASON FOR VISIT
[Patient preference] : as per patient preference [Access issues (e.g., transportation, impaired mobility, etc.)] : due to patient's access issues [Telehealth (audio & video) - Individual/Group] : This visit was provided via telehealth using real-time 2-way audio visual technology. [Other Location: e.g. Home (Enter Location, City,State)___] : The patient, [unfilled], was located at [unfilled] at the time of the visit. [Verbal consent obtained from patient/other participant(s)] : Verbal consent for telehealth/telephonic services obtained from patient/other participant(s) [FreeTextEntry5] : 12:00pm [FreeTextEntry4] : 11:00am [Private Practice - Psychiatrist] : Private Practice - Psychiatrist [[facility] Provider/Facility] : [facility] Provider/Facility [Patient] : Patient

## 2024-02-09 NOTE — HISTORY OF PRESENT ILLNESS
[Not Applicable] : Not applicable [FreeTextEntry1] : Fabien presented for a 60 minute telehealth intake appointment, on time. Fabien is interested in individual psychotherapy (telehealth) to address reported symptoms of depression.  Fabien reports first experiencing symptoms of Depression in Middle school. He identifies his depression as constantly feeling overwhelmed, seeing distressing images, tearing up, lacking energy and lacking appetite.  He questions daily whether his life worth living. Fabien reports daily vivid distressing images of his Suicide, images including jumping off bridges, tall buildings, being shot in the head, tying sheets and placing plastic bags over his head. Fabien reports he has no need to fulfill these attempts and does not want to act on these images.  He has one previous psychiatric hospitalization in 2021 for 7 days due to increased Suicidal ideation. Fabien reports 7 SSRI trials and all were not effective and blunted any emotions he experienced.  Fabien has previously attended therapy with Dr Karly Dick for a year and a half; and short term therapy at his School, [employer].  He reports both were helpful until they were not. Fabien identified that he is curious if he meets the criteria for ASD, and has often discussed with previous providers if he meets the criteria. aFbien shared he struggles with feeling overwhelmed often and has numerous sensory sensitivities that are distressing to him.  Fabien is currently looking for therapy to better learn how to cope with feeling overwhelmed, learn how to be more social and help with getting through schoolwork.  [FreeTextEntry2] : Fabien denied history of HI, bipolar/ehsan/psychosis.  He has experienced SI in the past and has one psychiatric hospitalization in 2021 for one week. He has had individual psychotherapy in the past. He has a younger brother and sister, fraternal twins who are both in their freshmen year of college.  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 He is motivated for psychotherapy at this time and committed to weekly sessions with writer.   [FreeTextEntry3] : 7 Different trials of SSRI, did not like them, reports did not help and blunted emotions.

## 2024-02-09 NOTE — HISTORY OF PRESENT ILLNESS
[Not Applicable] : Not applicable [FreeTextEntry1] : Fabien presented for a 60 minute telehealth intake appointment, on time. Fabien is interested in individual psychotherapy (telehealth) to address reported symptoms of depression.  Fabien reports first experiencing symptoms of Depression in Middle school. He identifies his depression as constantly feeling overwhelmed, seeing distressing images, tearing up, lacking energy and lacking appetite.  He questions daily whether his life worth living. Fabien reports daily vivid distressing images of his Suicide, images including jumping off bridges, tall buildings, being shot in the head, tying sheets and placing plastic bags over his head. Fabien reports he has no need to fulfill these attempts and does not want to act on these images.  He has one previous psychiatric hospitalization in 2021 for 7 days due to increased Suicidal ideation. Fabien reports 7 SSRI trials and all were not effective and blunted any emotions he experienced.  Fabien has previously attended therapy with Dr Karly Dick for a year and a half; and short term therapy at his School, [employer].  He reports both were helpful until they were not. Fabien identified that he is curious if he meets the criteria for ASD, and has often discussed with previous providers if he meets the criteria. Fabien shared he struggles with feeling overwhelmed often and has numerous sensory sensitivities that are distressing to him.  Fabien is currently looking for therapy to better learn how to cope with feeling overwhelmed, learn how to be more social and help with getting through schoolwork.  [FreeTextEntry2] : Fabien denied history of HI, bipolar/ehsan/psychosis.  He has experienced SI in the past and has one psychiatric hospitalization in 2021 for one week. He has had individual psychotherapy in the past. He has a younger brother and sister, fraternal twins who are both in their freshmen year of college.  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 He is motivated for psychotherapy at this time and committed to weekly sessions with writer.   [FreeTextEntry3] : 7 Different trials of SSRI, did not like them, reports did not help and blunted emotions.

## 2024-02-09 NOTE — PLAN
[Patient not appropriate for program] : Patient not appropriate for program [FreeTextEntry2] : [facility] [Every ___ week(s)] : Psychotherapy: Every [unfilled] week(s) [Individual Therapy] : Individual Therapy [FreeTextEntry4] : Problem: Depression Goal: pt will engage in behavioral activation to improve mood with scheduling of activities on 5 out of 7 days a week

## 2024-02-09 NOTE — PLAN
[Patient not appropriate for program] : Patient not appropriate for program [FreeTextEntry2] : [facility] [Every ___ week(s)] : Psychotherapy: Every [unfilled] week(s) [Individual Therapy] : Individual Therapy [FreeTextEntry4] : Problem: Depression Goal: pt will engage in behavioral activation to improve mood with scheduling of activities on 5 out of 7 days a week

## 2024-02-09 NOTE — REASON FOR VISIT
[Patient preference] : as per patient preference [Access issues (e.g., transportation, impaired mobility, etc.)] : due to patient's access issues [Telehealth (audio & video) - Individual/Group] : This visit was provided via telehealth using real-time 2-way audio visual technology. [Other Location: e.g. Home (Enter Location, City,State)___] : The patient, [unfilled], was located at [unfilled] at the time of the visit. [Verbal consent obtained from patient/other participant(s)] : Verbal consent for telehealth/telephonic services obtained from patient/other participant(s) [FreeTextEntry4] : 11:00am [FreeTextEntry5] : 12:00pm [Private Practice - Psychiatrist] : Private Practice - Psychiatrist [[facility] Provider/Facility] : [facility] Provider/Facility [Patient] : Patient

## 2024-02-13 NOTE — DISCUSSION/SUMMARY
[FreeTextEntry1] : Intake Assessment reviewed with Clinic Directors Natalee Gil and KENYA Lr BOCS scored Mild to Mid OCD. Referral to OCD Clinic would be most appropiate. Writer connected with Yoel Concepcion for referral. Writer left message for patient, waiting for return call.

## 2024-02-20 ENCOUNTER — NON-APPOINTMENT (OUTPATIENT)
Age: 24
End: 2024-02-20

## 2024-02-20 NOTE — DISCUSSION/SUMMARY
[FreeTextEntry1] : this writer made contact with the above-named patent to give him resources for follow up care. Info regarding the OCD center ([email or web address]) provided to patient he was encouraged to follow up. This writer spoke to patient via telephone 2x (2/17 and 2/20)  he was appreciative of follow up. No other needs noted at this time.

## 2024-07-15 ENCOUNTER — APPOINTMENT (OUTPATIENT)
Dept: PSYCHIATRY | Facility: CLINIC | Age: 24
End: 2024-07-15
Payer: COMMERCIAL

## 2024-07-15 DIAGNOSIS — F42.2 MIXED OBSESSIONAL THOUGHTS AND ACTS: ICD-10-CM

## 2024-07-15 DIAGNOSIS — F12.90 CANNABIS USE, UNSPECIFIED, UNCOMPLICATED: ICD-10-CM

## 2024-07-15 DIAGNOSIS — F41.1 GENERALIZED ANXIETY DISORDER: ICD-10-CM

## 2024-07-15 PROCEDURE — 90792 PSYCH DIAG EVAL W/MED SRVCS: CPT

## 2024-09-03 ENCOUNTER — APPOINTMENT (OUTPATIENT)
Dept: PSYCHIATRY | Facility: CLINIC | Age: 24
End: 2024-09-03

## 2024-09-03 DIAGNOSIS — F51.05 INSOMNIA DUE TO OTHER MENTAL DISORDER: ICD-10-CM

## 2024-09-03 DIAGNOSIS — F41.1 GENERALIZED ANXIETY DISORDER: ICD-10-CM

## 2024-09-03 DIAGNOSIS — F42.2 MIXED OBSESSIONAL THOUGHTS AND ACTS: ICD-10-CM

## 2024-09-03 DIAGNOSIS — F42.9 OBSESSIVE-COMPULSIVE DISORDER, UNSPECIFIED: ICD-10-CM

## 2024-09-03 DIAGNOSIS — F33.1 MAJOR DEPRESSIVE DISORDER, RECURRENT, MODERATE: ICD-10-CM

## 2024-09-03 DIAGNOSIS — F12.90 CANNABIS USE, UNSPECIFIED, UNCOMPLICATED: ICD-10-CM

## 2024-09-03 PROCEDURE — 99214 OFFICE O/P EST MOD 30 MIN: CPT | Mod: 95

## 2024-09-03 RX ORDER — HYDROXYZINE PAMOATE 25 MG/1
25 CAPSULE ORAL EVERY 6 HOURS
Qty: 60 | Refills: 2 | Status: ACTIVE | COMMUNITY
Start: 2024-09-03 | End: 1900-01-01

## 2024-09-03 NOTE — REASON FOR VISIT
[Patient preference] : as per patient preference [Other Location: e.g. Home (Enter Location, City,State)___] : The patient, [unfilled], was located at [unfilled] at the time of the visit. [Patient] : Patient [FreeTextEntry4] : 1028 [FreeTextEntry1] : MELVIN MASON is a 24 year old M,  being seen today for a follow up visit for medication management

## 2024-09-03 NOTE — REASON FOR VISIT
[Patient preference] : as per patient preference [Other Location: e.g. Home (Enter Location, City,State)___] : The patient, [unfilled], was located at [unfilled] at the time of the visit. [Patient] : Patient [FreeTextEntry4] : 9241 [FreeTextEntry1] : MELVIN MASON is a 24 year old M,  being seen today for a follow up visit for medication management

## 2024-09-03 NOTE — HISTORY OF PRESENT ILLNESS
[Not Applicable] : Not applicable [FreeTextEntry1] : Fabien presented for a 60 minute telehealth intake appointment, on time. Fabien is interested in individual psychotherapy (telehealth) to address reported symptoms of depression.  aFbien reports first experiencing symptoms of Depression in Middle school. He identifies his depression as constantly feeling overwhelmed, seeing distressing images, tearing up, lacking energy and lacking appetite.  He questions daily whether his life worth living. Fabien reports daily vivid distressing images of his Suicide, images including jumping off bridges, tall buildings, being shot in the head, tying sheets and placing plastic bags over his head. Fabien reports he has no need to fulfill these attempts and does not want to act on these images.  He has one previous psychiatric hospitalization in 2021 for 7 days due to increased Suicidal ideation. Fabien reports 7 SSRI trials and all were not effective and blunted any emotions he experienced.  Fabien has previously attended therapy with Dr Karly Dick for a year and a half; and short term therapy at his School, [employer].  He reports both were helpful until they were not. Fabien identified that he is curious if he meets the criteria for ASD, and has often discussed with previous providers if he meets the criteria. Fabien shared he struggles with feeling overwhelmed often and has numerous sensory sensitivities that are distressing to him.  Fabien is currently looking for therapy to better learn how to cope with feeling overwhelmed, learn how to be more social and help with getting through schoolwork.  [FreeTextEntry2] : Fabien denied history of HI, bipolar/ehsan/psychosis.  He has experienced SI in the past and has one psychiatric hospitalization in 2021 for one week. He has had individual psychotherapy in the past. He has a younger brother and sister, fraternal twins who are both in their freshmen year of college.  Fabien is entering his senior year in College at [employer], majoring in Biology.    [FreeTextEntry3] : 7 Different trials of SSRI, did not like them, reports did not help and blunted emotions.   Med HX; Venlafaxine 112.5 mg 7/23 Sertraline/ Mirtazapine 2022 Latuda 40mg 10/3/2022 Sertraline 100mg Wellbutrin 150mg  Melatonin 5

## 2024-09-03 NOTE — SOCIAL HISTORY
[FreeTextEntry1] :  Fabien is a 23 y/o male. He attends [employer] and lives in off campus housing with friends.  He reports a good relationship with his friends, his family and his classmates. 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 He is a senior, currenting majoring in Biology.

## 2024-09-03 NOTE — END OF VISIT
[Time Spent: ___ minutes] : I have spent [unfilled] minutes of time on the encounter which excludes teaching and separately reported services. [Licensed Clinician] : Licensed Clinician

## 2024-09-03 NOTE — PLAN
[No] : No [Unable to complete assessment] : Unable to complete assessment [Individual Therapy] : Individual Therapy [FreeTextEntry5] :  Mood and Anxiety Management - Continue to monitor - Will consider Lamotrigine in the future for off-label benefits with anxiety and mood stabilization - Discussed healthy daily routines including exercise, mindfulness activities, and leisure activities - Referral for outpatient psychotherapy for ongoing coping skills and emotional support.   Cannabis use: - Consider Medical Marijuana card to harm risk reduction - Provided with referral -Long-term/Short-term risk associated with use discussed, including risks of increased morbidity/mortality - Risks of combining with psychotropic medications discussed.    Medication changes this visit: None   Follow-up and Monitoring:   - Patient was given opportunity to ask questions, all questions were answered, patient is agreeable to treatment plan   - Monitor symptoms and consider further evaluation/medication intervention if symptoms worsen/persist   - Psychoeducation and supportive therapy provided, and discussed rationale for the recommended medications   - Educated patient of importance of remaining abstinent from drugs and alcohol, risks of short-term and long-term use, hazards of combining with medications   - Emergency procedures were discussed: Pt educated to call [phone], 911 or go to the nearest ER for worsening symptoms/suicidal/homicidal ideations   - Follow-up appointment scheduled for: 8 weeks     Plan for next visit:   - Assess patient's response to medication changes and overall mental health status.   - Address any new symptoms or concerns that may arise. [FreeTextEntry3] : First intake, will continue to assess in 2nd intake [FreeTextEntry4] : Problem: Depression Goal: pt will engage in behavioral activation to improve mood with scheduling of activities on 5 out of 7 days a week

## 2024-09-03 NOTE — SOCIAL HISTORY
[FreeTextEntry1] :  Fabien is a 23 y/o male. He attends [employer] and lives in off campus housing with friends.  He reports a good relationship with his friends, his family and his classmates. 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 He is a senior, currenting majoring in Biology.   3 = A little assistance 2 = A lot of assistance

## 2024-09-03 NOTE — DISCUSSION/SUMMARY
[FreeTextEntry1] :  Fabien is a 23 YO male, domiciled in private home with parents, student at [employer] in [place]. PPHX of Anxiety, Mixed obsessional thoughts and acts, MDD. Active Cannabis use. Previous IP psychiatric hospitalizations for SI in 2021,no documented/reported history of substance abuse, no legal hx or hx of violence.   [facility] ISTOP: Reference #: [id number]   IMPRESSION: AYANNA, MDD, recurrent in remission, Mixed obsessional thoughts and acts  DDX: ASD, OCPD, Mood disorder unspecified   Mood and anxiety are stable at this time without psychotropic intervention. Sxs exacerbated by situational stressors. Several previous SSRI trials / NDRI, Atypical antipsychotic with affective blunting intolerable to patient subsequently discontinued. No SIIP/HIIP/NSSIB/AVH/ Psychosis/ Panic reported Safety concerns: No identifiable risks, patient is not a harm risk to self or others, remains appropriate for outpatient level of care. Patient remains engaged in work, leisure activities, routine physical exercise, and maintaining ADLs. Medications: None

## 2024-09-03 NOTE — HISTORY OF PRESENT ILLNESS
[Not Applicable] : Not applicable [FreeTextEntry1] : Fabien presented for a 60 minute telehealth intake appointment, on time. Fabien is interested in individual psychotherapy (telehealth) to address reported symptoms of depression.  Fabien reports first experiencing symptoms of Depression in Middle school. He identifies his depression as constantly feeling overwhelmed, seeing distressing images, tearing up, lacking energy and lacking appetite.  He questions daily whether his life worth living. Fabien reports daily vivid distressing images of his Suicide, images including jumping off bridges, tall buildings, being shot in the head, tying sheets and placing plastic bags over his head. Fabien reports he has no need to fulfill these attempts and does not want to act on these images.  He has one previous psychiatric hospitalization in 2021 for 7 days due to increased Suicidal ideation. Fabien reports 7 SSRI trials and all were not effective and blunted any emotions he experienced.  Fabien has previously attended therapy with Dr Karly Dick for a year and a half; and short term therapy at his School, [employer].  He reports both were helpful until they were not. Fabien identified that he is curious if he meets the criteria for ASD, and has often discussed with previous providers if he meets the criteria. Fabien shared he struggles with feeling overwhelmed often and has numerous sensory sensitivities that are distressing to him.  Fabien is currently looking for therapy to better learn how to cope with feeling overwhelmed, learn how to be more social and help with getting through schoolwork.  [FreeTextEntry2] : Fabien denied history of HI, bipolar/ehsan/psychosis.  He has experienced SI in the past and has one psychiatric hospitalization in 2021 for one week. He has had individual psychotherapy in the past. He has a younger brother and sister, fraternal twins who are both in their freshmen year of college.  Fabien is entering his senior year in College at [employer], majoring in Biology.    [FreeTextEntry3] : 7 Different trials of SSRI, did not like them, reports did not help and blunted emotions.   Med HX; Venlafaxine 112.5 mg 7/23 Sertraline/ Mirtazapine 2022 Latuda 40mg 10/3/2022 Sertraline 100mg Wellbutrin 150mg  Melatonin 5

## 2024-09-03 NOTE — DISCUSSION/SUMMARY
[FreeTextEntry1] :  Fabien is a 25 YO male, domiciled in private home with parents, student at [employer] in [place]. PPHX of Anxiety, Mixed obsessional thoughts and acts, MDD. Active Cannabis use. Previous IP psychiatric hospitalizations for SI in 2021,no documented/reported history of substance abuse, no legal hx or hx of violence.   [facility] ISTOP: Reference #: [id number]   IMPRESSION: AYANNA, MDD, recurrent in remission, Mixed obsessional thoughts and acts  DDX: ASD, OCPD, Mood disorder unspecified   Mood and anxiety are stable at this time without psychotropic intervention. Sxs exacerbated by situational stressors. Several previous SSRI trials / NDRI, Atypical antipsychotic with affective blunting intolerable to patient subsequently discontinued. No SIIP/HIIP/NSSIB/AVH/ Psychosis/ Panic reported Safety concerns: No identifiable risks, patient is not a harm risk to self or others, remains appropriate for outpatient level of care. Patient remains engaged in work, leisure activities, routine physical exercise, and maintaining ADLs. Medications: None

## 2024-09-09 RX ORDER — FLUOXETINE HYDROCHLORIDE 10 MG/1
10 TABLET ORAL DAILY
Qty: 30 | Refills: 1 | Status: ACTIVE | COMMUNITY
Start: 2024-09-03 | End: 1900-01-01

## 2024-09-12 NOTE — ED STATDOCS - NSICDXPASTMEDICALHX_GEN_ALL_CORE_FT
1.  Since you have already picked up the 2.5 mg keep those for now and maintain your current cycle however I will up you to 5 mg and you will start that as soon as it is ready.  Therefore even if you have taken only 1 dose of the 2.5 and your 5 mg is ready increase to 5 mg and we will end up using the 2.5 to either make 5 mg or 7.5 so you do not waste these.  After you finish 4 doses of the 5 mg we will actually jump to 10 mg as a prescription which I will give you today but 7.5 mg is the actual next dose so that is a possibility with the meds you have left over.     Once you have taken 2 or 3 dosages of the 5 mg and as long as you are feeling fine just send me a [employer] message telling me you are going up to 10 and then were done.  
PAST MEDICAL HISTORY:  History of depression

## 2024-09-17 ENCOUNTER — APPOINTMENT (OUTPATIENT)
Dept: PSYCHIATRY | Facility: CLINIC | Age: 24
End: 2024-09-17

## 2024-10-01 ENCOUNTER — APPOINTMENT (OUTPATIENT)
Dept: PSYCHIATRY | Facility: CLINIC | Age: 24
End: 2024-10-01
Payer: COMMERCIAL

## 2024-10-01 DIAGNOSIS — F12.90 CANNABIS USE, UNSPECIFIED, UNCOMPLICATED: ICD-10-CM

## 2024-10-01 DIAGNOSIS — F41.1 GENERALIZED ANXIETY DISORDER: ICD-10-CM

## 2024-10-01 DIAGNOSIS — F42.9 OBSESSIVE-COMPULSIVE DISORDER, UNSPECIFIED: ICD-10-CM

## 2024-10-01 DIAGNOSIS — F51.05 INSOMNIA DUE TO OTHER MENTAL DISORDER: ICD-10-CM

## 2024-10-01 DIAGNOSIS — F33.1 MAJOR DEPRESSIVE DISORDER, RECURRENT, MODERATE: ICD-10-CM

## 2024-10-01 PROCEDURE — 99214 OFFICE O/P EST MOD 30 MIN: CPT | Mod: 95

## 2024-10-01 NOTE — SOCIAL HISTORY
[FreeTextEntry1] :  Fabien is a 25 y/o male. He attends [employer] and lives in off campus housing with friends.  He reports a good relationship with his friends, his family and his classmates. 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 He is a senior, currenting majoring in Biology.

## 2024-10-01 NOTE — PLAN
[No] : No [Individual Therapy] : Individual Therapy [Order(s) for medication placed in [facility] EHR] : Medication [FreeTextEntry5] :  Mood and Anxiety Management -  Increase Fluoxetine 20mg once daily; will plan to titrate as tolerability is established, as needed. - Will consider Lamotrigine in the future for off-label benefits with anxiety and mood stabilization - Discussed healthy daily routines including exercise, mindfulness activities, and leisure activities - Referral for outpatient psychotherapy for ongoing coping skills and emotional support.   Cannabis use: - Consider Medical Marijuana card to harm risk reduction - Provided with referral -Long-term/Short-term risk associated with use discussed, including risks of increased morbidity/mortality - Risks of combining with psychotropic medications discussed.    Medication changes this visit: Titrate Fluoxetine   Follow-up and Monitoring:   - Patient was given opportunity to ask questions, all questions were answered, patient is agreeable to treatment plan   - Monitor symptoms and consider further evaluation/medication intervention if symptoms worsen/persist   - Psychoeducation and supportive therapy provided, and discussed rationale for the recommended medications   - Educated patient of importance of remaining abstinent from drugs and alcohol, risks of short-term and long-term use, hazards of combining with medications   - Emergency procedures were discussed: Pt educated to call [phone], 911 or go to the nearest ER for worsening symptoms/suicidal/homicidal ideations   - Follow-up appointment scheduled for: 8 weeks     Plan for next visit:   - Assess patient's response to medication changes and overall mental health status.   - Address any new symptoms or concerns that may arise.

## 2024-10-01 NOTE — DISCUSSION/SUMMARY
[FreeTextEntry1] :  Fabien is a 23 YO male, domiciled in private home with parents, student at [employer] in [place]. PPHX of Anxiety, Mixed obsessional thoughts and acts, MDD. Active Cannabis use. Previous IP psychiatric hospitalizations for SI in 2021,no documented/reported history of substance abuse, no legal hx or hx of violence.    IMPRESSION: AYANNA, MDD, recurrent in remission, OCD  DDX: ASD, OCPD, Mood disorder unspecified   Mood and anxiety Sxs: Responding to initiation of Fluoxetine; remains with anxiety.  OCD sxs: Targeted symptoms improving Several previous SSRI trials / NDRI, Atypical antipsychotic with affective blunting intolerable to patient subsequently discontinued. No problematic substance use identified No SIIP/HIIP/NSSIB/AVH/ Psychosis/ Panic reported Safety concerns: No identifiable risks, patient is not a harm risk to self or others, remains appropriate for outpatient level of care. Patient remains engaged in work, leisure activities, routine physical exercise, and maintaining ADLs. Medications: Fluoxetine 10mg once daily; hydroxyzine PRN

## 2024-10-01 NOTE — HISTORY OF PRESENT ILLNESS
[Not Applicable] : Not applicable [FreeTextEntry1] : Fabien presented for a 60 minute telehealth intake appointment, on time. Fabien is interested in individual psychotherapy (telehealth) to address reported symptoms of depression.  Fabien reports first experiencing symptoms of Depression in Middle school. He identifies his depression as constantly feeling overwhelmed, seeing distressing images, tearing up, lacking energy and lacking appetite.  He questions daily whether his life worth living. Fabien reports daily vivid distressing images of his Suicide, images including jumping off bridges, tall buildings, being shot in the head, tying sheets and placing plastic bags over his head. Fabien reports he has no need to fulfill these attempts and does not want to act on these images.  He has one previous psychiatric hospitalization in 2021 for 7 days due to increased Suicidal ideation. Fabien reports 7 SSRI trials and all were not effective and blunted any emotions he experienced.  Fabien has previously attended therapy with Dr Karly Dick for a year and a half; and short term therapy at his School, [employer].  He reports both were helpful until they were not. Fabien identified that he is curious if he meets the criteria for ASD, and has often discussed with previous providers if he meets the criteria. Fabien shared he struggles with feeling overwhelmed often and has numerous sensory sensitivities that are distressing to him.  Fabien is currently looking for therapy to better learn how to cope with feeling overwhelmed, learn how to be more social and help with getting through schoolwork.  [FreeTextEntry2] : Fabien denied history of HI, bipolar/ehsan/psychosis.  He has experienced SI in the past and has one psychiatric hospitalization in 2021 for one week. He has had individual psychotherapy in the past. He has a younger brother and sister, fraternal twins who are both in their freshmen year of college.  Fabien is entering his senior year in College at [employer], majoring in Biology.    [FreeTextEntry3] : 7 Different trials of SSRI, did not like them, reports did not help and blunted emotions.   Med HX; Venlafaxine 112.5 mg 7/23 Sertraline/ Mirtazapine 2022 Latuda 40mg 10/3/2022 Sertraline 100mg Wellbutrin 150mg  Melatonin 5

## 2024-10-01 NOTE — REASON FOR VISIT
[Patient preference] : as per patient preference [Other Location: e.g. Home (Enter Location, City,State)___] : The patient, [unfilled], was located at [unfilled] at the time of the visit. [Patient] : Patient [FreeTextEntry4] : 1694 [FreeTextEntry5] : 2918 [FreeTextEntry1] : MELVIN MASON is a 24 year old M,  being seen today for a follow up visit for medication management

## 2024-10-01 NOTE — PHYSICAL EXAM
[None] : none [Cooperative] : cooperative [Full] : full [Clear] : clear [Linear/Goal Directed] : linear/goal directed [Average] : average [WNL] : within normal limits [Anxious] : anxious

## 2024-10-07 RX ORDER — FLUOXETINE HYDROCHLORIDE 20 MG/1
20 CAPSULE ORAL
Qty: 90 | Refills: 1 | Status: ACTIVE | COMMUNITY
Start: 2024-10-07 | End: 1900-01-01

## 2025-03-12 ENCOUNTER — APPOINTMENT (OUTPATIENT)
Dept: INTERNAL MEDICINE | Facility: CLINIC | Age: 25
End: 2025-03-12
Payer: COMMERCIAL

## 2025-03-12 ENCOUNTER — NON-APPOINTMENT (OUTPATIENT)
Age: 25
End: 2025-03-12

## 2025-03-12 VITALS
SYSTOLIC BLOOD PRESSURE: 102 MMHG | HEIGHT: 70 IN | TEMPERATURE: 98 F | HEART RATE: 52 BPM | WEIGHT: 148 LBS | BODY MASS INDEX: 21.19 KG/M2 | DIASTOLIC BLOOD PRESSURE: 62 MMHG | OXYGEN SATURATION: 95 % | RESPIRATION RATE: 16 BRPM

## 2025-03-12 DIAGNOSIS — Z80.8 FAMILY HISTORY OF MALIGNANT NEOPLASM OF OTHER ORGANS OR SYSTEMS: ICD-10-CM

## 2025-03-12 DIAGNOSIS — Z00.00 ENCOUNTER FOR GENERAL ADULT MEDICAL EXAMINATION W/OUT ABNORMAL FINDINGS: ICD-10-CM

## 2025-03-12 PROCEDURE — G0444 DEPRESSION SCREEN ANNUAL: CPT | Mod: 59

## 2025-03-12 PROCEDURE — 99385 PREV VISIT NEW AGE 18-39: CPT

## 2025-03-13 LAB
25(OH)D3 SERPL-MCNC: 23.4 NG/ML
ALBUMIN SERPL ELPH-MCNC: 4.8 G/DL
ALP BLD-CCNC: 78 U/L
ALT SERPL-CCNC: 13 U/L
ANION GAP SERPL CALC-SCNC: 14 MMOL/L
APPEARANCE: CLEAR
AST SERPL-CCNC: 17 U/L
BASOPHILS # BLD AUTO: 0.06 K/UL
BASOPHILS NFR BLD AUTO: 1 %
BILIRUB SERPL-MCNC: 0.6 MG/DL
BILIRUBIN URINE: NEGATIVE
BLOOD URINE: NEGATIVE
BUN SERPL-MCNC: 15 MG/DL
CALCIUM SERPL-MCNC: 9.8 MG/DL
CHLORIDE SERPL-SCNC: 104 MMOL/L
CHOLEST SERPL-MCNC: 169 MG/DL
CO2 SERPL-SCNC: 25 MMOL/L
COLOR: YELLOW
CREAT SERPL-MCNC: 1.18 MG/DL
EGFRCR SERPLBLD CKD-EPI 2021: 89 ML/MIN/1.73M2
EOSINOPHIL # BLD AUTO: 0.18 K/UL
EOSINOPHIL NFR BLD AUTO: 2.9 %
ESTIMATED AVERAGE GLUCOSE: 105 MG/DL
GLUCOSE QUALITATIVE U: NEGATIVE MG/DL
GLUCOSE SERPL-MCNC: 86 MG/DL
HBA1C MFR BLD HPLC: 5.3 %
HCT VFR BLD CALC: 47.7 %
HDLC SERPL-MCNC: 49 MG/DL
HGB BLD-MCNC: 15.3 G/DL
IMM GRANULOCYTES NFR BLD AUTO: 0.3 %
KETONES URINE: NEGATIVE MG/DL
LDLC SERPL CALC-MCNC: 106 MG/DL
LEUKOCYTE ESTERASE URINE: NEGATIVE
LYMPHOCYTES # BLD AUTO: 1.7 K/UL
LYMPHOCYTES NFR BLD AUTO: 27.5 %
MAN DIFF?: NORMAL
MCHC RBC-ENTMCNC: 28.4 PG
MCHC RBC-ENTMCNC: 32.1 G/DL
MCV RBC AUTO: 88.5 FL
MONOCYTES # BLD AUTO: 0.41 K/UL
MONOCYTES NFR BLD AUTO: 6.6 %
NEUTROPHILS # BLD AUTO: 3.82 K/UL
NEUTROPHILS NFR BLD AUTO: 61.7 %
NITRITE URINE: NEGATIVE
NONHDLC SERPL-MCNC: 120 MG/DL
PH URINE: 5.5
PLATELET # BLD AUTO: 218 K/UL
POTASSIUM SERPL-SCNC: 4.2 MMOL/L
PROT SERPL-MCNC: 7.2 G/DL
PROTEIN URINE: NEGATIVE MG/DL
RBC # BLD: 5.39 M/UL
RBC # FLD: 13.4 %
SODIUM SERPL-SCNC: 144 MMOL/L
SPECIFIC GRAVITY URINE: 1.03
TRIGL SERPL-MCNC: 75 MG/DL
TSH SERPL-ACNC: 1.2 UIU/ML
URATE SERPL-MCNC: 6.1 MG/DL
UROBILINOGEN URINE: 0.2 MG/DL
WBC # FLD AUTO: 6.19 K/UL

## 2025-03-21 DIAGNOSIS — L65.9 NONSCARRING HAIR LOSS, UNSPECIFIED: ICD-10-CM

## 2025-06-09 ENCOUNTER — APPOINTMENT (OUTPATIENT)
Dept: DERMATOLOGY | Facility: CLINIC | Age: 25
End: 2025-06-09
Payer: COMMERCIAL

## 2025-06-09 PROCEDURE — 99204 OFFICE O/P NEW MOD 45 MIN: CPT

## 2025-09-08 ENCOUNTER — APPOINTMENT (OUTPATIENT)
Dept: DERMATOLOGY | Facility: CLINIC | Age: 25
End: 2025-09-08